# Patient Record
Sex: MALE | Race: WHITE | NOT HISPANIC OR LATINO | Employment: PART TIME | URBAN - METROPOLITAN AREA
[De-identification: names, ages, dates, MRNs, and addresses within clinical notes are randomized per-mention and may not be internally consistent; named-entity substitution may affect disease eponyms.]

---

## 2018-06-12 LAB
HEPATITIS C ANTIBODY (HISTORICAL): NORMAL
HIV 1/2 AB DIFFERENTIATION (HISTORICAL): NEGATIVE
RPR SCREEN (HISTORICAL): NORMAL

## 2018-06-14 LAB
HERPES SIMPLEX VIRUS 1 IGG (HISTORICAL): 28.3
HERPES SIMPLEX VIRUS 2 IGG (HISTORICAL): 0.07

## 2018-07-09 PROBLEM — Z72.0 TOBACCO USER: Status: ACTIVE | Noted: 2017-05-08

## 2018-07-09 PROBLEM — K21.9 GASTROESOPHAGEAL REFLUX DISEASE WITHOUT ESOPHAGITIS: Status: ACTIVE | Noted: 2017-05-08

## 2018-07-09 PROBLEM — F25.0 SCHIZOAFFECTIVE DISORDER, BIPOLAR TYPE (HCC): Status: ACTIVE | Noted: 2017-05-08

## 2018-07-09 PROBLEM — I10 BENIGN ESSENTIAL HYPERTENSION: Status: ACTIVE | Noted: 2017-05-08

## 2018-07-19 ENCOUNTER — HOSPITAL ENCOUNTER (INPATIENT)
Facility: HOSPITAL | Age: 48
LOS: 27 days | Discharge: HOME | DRG: 885 | End: 2018-08-16
Attending: EMERGENCY MEDICINE | Admitting: PSYCHIATRY & NEUROLOGY
Payer: COMMERCIAL

## 2018-07-19 DIAGNOSIS — R45.850 HOMICIDAL IDEATION: ICD-10-CM

## 2018-07-19 DIAGNOSIS — F25.0 SCHIZOAFFECTIVE DISORDER, BIPOLAR TYPE (HCC): Primary | ICD-10-CM

## 2018-07-19 DIAGNOSIS — R45.1 AGITATION: ICD-10-CM

## 2018-07-19 LAB
ALBUMIN SERPL BCP-MCNC: 4.6 G/DL (ref 3–5.2)
ALP SERPL-CCNC: 72 U/L (ref 43–122)
ALT SERPL W P-5'-P-CCNC: 49 U/L (ref 9–52)
AMPHETAMINES SERPL QL SCN: NEGATIVE
ANION GAP SERPL CALCULATED.3IONS-SCNC: 10 MMOL/L (ref 5–14)
AST SERPL W P-5'-P-CCNC: 41 U/L (ref 17–59)
BACTERIA UR QL AUTO: ABNORMAL /HPF
BARBITURATES UR QL: NEGATIVE
BASOPHILS # BLD AUTO: 0.1 THOUSANDS/ΜL (ref 0–0.1)
BASOPHILS NFR BLD AUTO: 1 % (ref 0–1)
BENZODIAZ UR QL: NEGATIVE
BILIRUB SERPL-MCNC: 0.6 MG/DL
BILIRUB UR QL STRIP: NEGATIVE
BUN SERPL-MCNC: 19 MG/DL (ref 5–25)
CALCIUM SERPL-MCNC: 9.6 MG/DL (ref 8.4–10.2)
CHLORIDE SERPL-SCNC: 106 MMOL/L (ref 97–108)
CLARITY UR: CLEAR
CO2 SERPL-SCNC: 28 MMOL/L (ref 22–30)
COCAINE UR QL: NEGATIVE
COLOR UR: YELLOW
CREAT SERPL-MCNC: 1.02 MG/DL (ref 0.7–1.5)
EOSINOPHIL # BLD AUTO: 0.1 THOUSAND/ΜL (ref 0–0.4)
EOSINOPHIL NFR BLD AUTO: 2 % (ref 0–6)
ERYTHROCYTE [DISTWIDTH] IN BLOOD BY AUTOMATED COUNT: 14.1 %
ETHANOL SERPL-MCNC: <10 MG/DL (ref 0–10)
GFR SERPL CREATININE-BSD FRML MDRD: 86 ML/MIN/1.73SQ M
GLUCOSE SERPL-MCNC: 117 MG/DL (ref 70–99)
GLUCOSE UR STRIP-MCNC: NEGATIVE MG/DL
HCT VFR BLD AUTO: 39.8 % (ref 41–53)
HGB BLD-MCNC: 13.4 G/DL (ref 13.5–17.5)
HGB UR QL STRIP.AUTO: 10
KETONES UR STRIP-MCNC: NEGATIVE MG/DL
LEUKOCYTE ESTERASE UR QL STRIP: 25
LYMPHOCYTES # BLD AUTO: 2.3 THOUSANDS/ΜL (ref 0.5–4)
LYMPHOCYTES NFR BLD AUTO: 32 % (ref 20–50)
MCH RBC QN AUTO: 30.9 PG (ref 26–34)
MCHC RBC AUTO-ENTMCNC: 33.8 G/DL (ref 31–36)
MCV RBC AUTO: 92 FL (ref 80–100)
METHADONE UR QL: NEGATIVE
MONOCYTES # BLD AUTO: 0.6 THOUSAND/ΜL (ref 0.2–0.9)
MONOCYTES NFR BLD AUTO: 8 % (ref 1–10)
NEUTROPHILS # BLD AUTO: 4.2 THOUSANDS/ΜL (ref 1.8–7.8)
NEUTS SEG NFR BLD AUTO: 58 % (ref 45–65)
NITRITE UR QL STRIP: NEGATIVE
NON-SQ EPI CELLS URNS QL MICRO: ABNORMAL /HPF
OPIATES UR QL SCN: NEGATIVE
PCP UR QL: NEGATIVE
PH UR STRIP.AUTO: 5 [PH] (ref 4.5–8)
PLATELET # BLD AUTO: 291 THOUSANDS/UL (ref 150–450)
PMV BLD AUTO: 9 FL (ref 8.9–12.7)
POTASSIUM SERPL-SCNC: 3.5 MMOL/L (ref 3.6–5)
PROT SERPL-MCNC: 7.8 G/DL (ref 5.9–8.4)
PROT UR STRIP-MCNC: ABNORMAL MG/DL
RBC # BLD AUTO: 4.35 MILLION/UL (ref 4.5–5.9)
RBC #/AREA URNS AUTO: ABNORMAL /HPF
SODIUM SERPL-SCNC: 144 MMOL/L (ref 137–147)
SP GR UR STRIP.AUTO: 1.02 (ref 1–1.04)
THC UR QL: NEGATIVE
UROBILINOGEN UA: NEGATIVE MG/DL
WBC # BLD AUTO: 7.2 THOUSAND/UL (ref 4.5–11)
WBC #/AREA URNS AUTO: ABNORMAL /HPF

## 2018-07-19 PROCEDURE — 80320 DRUG SCREEN QUANTALCOHOLS: CPT | Performed by: EMERGENCY MEDICINE

## 2018-07-19 PROCEDURE — 80053 COMPREHEN METABOLIC PANEL: CPT | Performed by: EMERGENCY MEDICINE

## 2018-07-19 PROCEDURE — 96372 THER/PROPH/DIAG INJ SC/IM: CPT

## 2018-07-19 PROCEDURE — 96374 THER/PROPH/DIAG INJ IV PUSH: CPT

## 2018-07-19 PROCEDURE — 85025 COMPLETE CBC W/AUTO DIFF WBC: CPT | Performed by: EMERGENCY MEDICINE

## 2018-07-19 PROCEDURE — 80307 DRUG TEST PRSMV CHEM ANLYZR: CPT | Performed by: EMERGENCY MEDICINE

## 2018-07-19 PROCEDURE — 36415 COLL VENOUS BLD VENIPUNCTURE: CPT | Performed by: EMERGENCY MEDICINE

## 2018-07-19 PROCEDURE — 81001 URINALYSIS AUTO W/SCOPE: CPT | Performed by: EMERGENCY MEDICINE

## 2018-07-19 RX ORDER — LORAZEPAM 2 MG/ML
INJECTION INTRAMUSCULAR
Status: COMPLETED
Start: 2018-07-19 | End: 2018-07-19

## 2018-07-19 RX ORDER — LORAZEPAM 2 MG/ML
2 INJECTION INTRAMUSCULAR ONCE
Status: COMPLETED | OUTPATIENT
Start: 2018-07-19 | End: 2018-07-19

## 2018-07-19 RX ORDER — HALOPERIDOL 5 MG/ML
10 INJECTION INTRAMUSCULAR ONCE
Status: COMPLETED | OUTPATIENT
Start: 2018-07-19 | End: 2018-07-19

## 2018-07-19 RX ORDER — BENZTROPINE MESYLATE 1 MG/ML
2 INJECTION INTRAMUSCULAR; INTRAVENOUS ONCE
Status: COMPLETED | OUTPATIENT
Start: 2018-07-19 | End: 2018-07-19

## 2018-07-19 RX ORDER — HALOPERIDOL 5 MG/ML
INJECTION INTRAMUSCULAR
Status: COMPLETED
Start: 2018-07-19 | End: 2018-07-19

## 2018-07-19 RX ORDER — BENZTROPINE MESYLATE 1 MG/ML
INJECTION INTRAMUSCULAR; INTRAVENOUS
Status: COMPLETED
Start: 2018-07-19 | End: 2018-07-19

## 2018-07-19 RX ADMIN — LORAZEPAM 2 MG: 2 INJECTION INTRAMUSCULAR; INTRAVENOUS at 17:57

## 2018-07-19 RX ADMIN — HALOPERIDOL LACTATE 10 MG: 5 INJECTION, SOLUTION INTRAMUSCULAR at 17:57

## 2018-07-19 RX ADMIN — BENZTROPINE MESYLATE 2 MG: 1 INJECTION INTRAMUSCULAR; INTRAVENOUS at 17:59

## 2018-07-19 RX ADMIN — HALOPERIDOL 10 MG: 5 INJECTION INTRAMUSCULAR at 17:57

## 2018-07-19 RX ADMIN — LORAZEPAM 2 MG: 2 INJECTION INTRAMUSCULAR at 17:57
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
## 2018-07-19 NOTE — ED NOTES
Patient change into hospital gown and made comfortable   Patient blood and urine is obtain and place on hold pending BLUE Whitney  07/19/18 9664

## 2018-07-20 PROCEDURE — 96372 THER/PROPH/DIAG INJ SC/IM: CPT

## 2018-07-20 PROCEDURE — 99285 EMERGENCY DEPT VISIT HI MDM: CPT

## 2018-07-20 RX ORDER — DIPHENHYDRAMINE HYDROCHLORIDE 50 MG/ML
50 INJECTION INTRAMUSCULAR; INTRAVENOUS EVERY 4 HOURS PRN
Status: DISCONTINUED | OUTPATIENT
Start: 2018-07-20 | End: 2018-08-16 | Stop reason: HOSPADM

## 2018-07-20 RX ORDER — PANTOPRAZOLE SODIUM 40 MG/1
40 TABLET, DELAYED RELEASE ORAL
Status: DISCONTINUED | OUTPATIENT
Start: 2018-07-21 | End: 2018-07-24

## 2018-07-20 RX ORDER — DIVALPROEX SODIUM 500 MG/1
500 TABLET, EXTENDED RELEASE ORAL DAILY
Status: DISCONTINUED | OUTPATIENT
Start: 2018-07-20 | End: 2018-07-24

## 2018-07-20 RX ORDER — HALOPERIDOL 5 MG
5 TABLET ORAL EVERY 4 HOURS PRN
Status: DISCONTINUED | OUTPATIENT
Start: 2018-07-20 | End: 2018-07-24

## 2018-07-20 RX ORDER — OLANZAPINE 10 MG/1
10 TABLET ORAL 3 TIMES DAILY
Status: DISCONTINUED | OUTPATIENT
Start: 2018-07-20 | End: 2018-07-25

## 2018-07-20 RX ORDER — OLANZAPINE 10 MG/1
INJECTION, POWDER, LYOPHILIZED, FOR SOLUTION INTRAMUSCULAR
Status: COMPLETED
Start: 2018-07-20 | End: 2018-07-20

## 2018-07-20 RX ORDER — HALOPERIDOL 5 MG/ML
5 INJECTION INTRAMUSCULAR EVERY 4 HOURS PRN
Status: DISCONTINUED | OUTPATIENT
Start: 2018-07-20 | End: 2018-07-24

## 2018-07-20 RX ORDER — LANOLIN ALCOHOL/MO/W.PET/CERES
9 CREAM (GRAM) TOPICAL
Status: DISCONTINUED | OUTPATIENT
Start: 2018-07-20 | End: 2018-08-16 | Stop reason: HOSPADM

## 2018-07-20 RX ORDER — TRAZODONE HYDROCHLORIDE 50 MG/1
50 TABLET ORAL
Status: DISCONTINUED | OUTPATIENT
Start: 2018-07-20 | End: 2018-07-20

## 2018-07-20 RX ORDER — HYDROXYZINE 50 MG/1
100 TABLET, FILM COATED ORAL EVERY 6 HOURS PRN
Status: DISCONTINUED | OUTPATIENT
Start: 2018-07-20 | End: 2018-08-16 | Stop reason: HOSPADM

## 2018-07-20 RX ORDER — ACETAMINOPHEN 325 MG/1
650 TABLET ORAL EVERY 6 HOURS PRN
Status: DISCONTINUED | OUTPATIENT
Start: 2018-07-20 | End: 2018-07-20

## 2018-07-20 RX ORDER — LORAZEPAM 2 MG/ML
2 INJECTION INTRAMUSCULAR EVERY 6 HOURS PRN
Status: DISCONTINUED | OUTPATIENT
Start: 2018-07-20 | End: 2018-08-16 | Stop reason: HOSPADM

## 2018-07-20 RX ORDER — IBUPROFEN 400 MG/1
400 TABLET ORAL EVERY 6 HOURS PRN
Status: DISCONTINUED | OUTPATIENT
Start: 2018-07-20 | End: 2018-08-16 | Stop reason: HOSPADM

## 2018-07-20 RX ORDER — LORAZEPAM 1 MG/1
1 TABLET ORAL EVERY 4 HOURS PRN
Status: DISCONTINUED | OUTPATIENT
Start: 2018-07-20 | End: 2018-08-16 | Stop reason: HOSPADM

## 2018-07-20 RX ORDER — OLANZAPINE 10 MG/1
10 INJECTION, POWDER, LYOPHILIZED, FOR SOLUTION INTRAMUSCULAR ONCE
Status: COMPLETED | OUTPATIENT
Start: 2018-07-20 | End: 2018-07-20

## 2018-07-20 RX ORDER — AMLODIPINE BESYLATE 5 MG/1
10 TABLET ORAL EVERY 24 HOURS
Status: DISCONTINUED | OUTPATIENT
Start: 2018-07-20 | End: 2018-08-08

## 2018-07-20 RX ORDER — BENZTROPINE MESYLATE 1 MG/1
1 TABLET ORAL EVERY 4 HOURS PRN
Status: DISCONTINUED | OUTPATIENT
Start: 2018-07-20 | End: 2018-08-16 | Stop reason: HOSPADM

## 2018-07-20 RX ORDER — ACETAMINOPHEN 325 MG/1
650 TABLET ORAL EVERY 4 HOURS PRN
Status: DISCONTINUED | OUTPATIENT
Start: 2018-07-20 | End: 2018-08-16 | Stop reason: HOSPADM

## 2018-07-20 RX ADMIN — DIVALPROEX SODIUM 500 MG: 500 TABLET, EXTENDED RELEASE ORAL at 16:10

## 2018-07-20 RX ADMIN — HALOPERIDOL 5 MG: 5 TABLET ORAL at 18:34

## 2018-07-20 RX ADMIN — OLANZAPINE 10 MG: 10 INJECTION, POWDER, LYOPHILIZED, FOR SOLUTION INTRAMUSCULAR at 12:25

## 2018-07-20 RX ADMIN — LORAZEPAM 1 MG: 1 TABLET ORAL at 21:11

## 2018-07-20 RX ADMIN — BENZTROPINE MESYLATE 1 MG: 0.5 TABLET ORAL at 21:11

## 2018-07-20 RX ADMIN — OLANZAPINE 10 MG: 10 TABLET, FILM COATED ORAL at 21:10

## 2018-07-20 RX ADMIN — OLANZAPINE 10 MG: 10 INJECTION, POWDER, FOR SOLUTION INTRAMUSCULAR at 12:25

## 2018-07-20 RX ADMIN — Medication 1 TABLET: at 16:09

## 2018-07-20 RX ADMIN — AMLODIPINE BESYLATE 10 MG: 5 TABLET ORAL at 16:09

## 2018-07-20 NOTE — ED NOTES
Patient in room, asking to be able to smoke one last cigarette before he goes up  Staff explained to him that this is a no smoking hospital and no one is allowed to smoke  Patient went back to sleep       Marbella Squires  07/20/18 1554

## 2018-07-20 NOTE — ED NOTES
Pt given toast and coffee with no other needs at this time     [address]  07/20/18 6042

## 2018-07-20 NOTE — ED NOTES
Insurance Authorization:   [id number] [facility] [address] Request faxed to  [id number]  Level of care: IP Psychiatry  Awaiting authorization number

## 2018-07-20 NOTE — ED NOTES
Per Shital Shaver at [facility], [phone], the patient previously had a guardian  However, once psychiatrically stabilized, the patient appealed and the guardianship order was overturned

## 2018-07-20 NOTE — ED NOTES
Patient came out and asked if he can pace back and forth in the room      Livia Templeton  07/20/18 2598

## 2018-07-20 NOTE — ED NOTES
Patient is accepted at [facility]  Patient is accepted by Dr Katiuska eBdolla            Patient may go to the floor following discharges from [facility]  Nurse report is to be called to  prior to patient transfer

## 2018-07-20 NOTE — ED NOTES
Pt safe in room at this time, asleep  Chest rising and pt in no distress or discomfort at this time    Continues to be a 1:1        Jeanne Murray RN  07/20/18 0873

## 2018-07-20 NOTE — ED NOTES
As per Dr Talya Castro, patient does not require 1:1 observation at this time  Patient remains within view of his nurse's workstation and will continue to be monitored       Quinn Thompson RN  07/20/18 0710

## 2018-07-20 NOTE — ED NOTES
Patient in room asked for his ID band to be changed because his name is Rhoda Pham and not [employer]  Then asked for a chair to sit because he did not want to sit on his bed after is made  Patient moved his furniture around to make it feel more like home        Wilfrid Monaco  07/20/18 9548

## 2018-07-20 NOTE — ED NOTES
Insurance Authorization: [employer]   Phone call placed to [facility]  Phone number: [phone]   Spoke to [place]  Level of care: IP Psychiatry  Call upon discharge  Authorization # not issued for [employer]

## 2018-07-20 NOTE — ED NOTES
Safety risk was reassessed  The patient is now a high risk for self-harm  Pt attempted to inconspicuously wrap a bed sheet around his neck while covered with a blanket
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
## 2018-07-20 NOTE — ED NOTES
Pt mostly laying in bed, occasionally pacing in room    1:1 obs remains for behavior/elopment risk       Bozena Claudio RN  07/20/18 8494
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
## 2018-07-20 NOTE — PLAN OF CARE
DISCHARGE PLANNING     Discharge to home or other facility with appropriate resources Not Progressing

## 2018-07-20 NOTE — ED NOTES
Patient moving furniture around in his room, pulled the emergency light in the bathroom again        Tian Chris  07/20/18 1003

## 2018-07-20 NOTE — ED NOTES
Patient went to the bathroom and pulled the emergency light on, because he wants a cigarette   Staff explained to him that there is no smoking in this hospital      Mariza Blake  07/20/18 7706

## 2018-07-20 NOTE — ED NOTES
Pt found to be pacing around and outside of room, attempted to leave via EMS doors, stated to be looking for a bathroom  Pt pacing/rambling  Placed pt back on 1:1 obs status d/t behavior/elopement risk  Aurora Amor RN  07/20/18 3653    7/20/2018, 0020, sign-out received, [id number], pt signed out to Dr Nuria Styles MD  07/20/18 3883

## 2018-07-20 NOTE — ED NOTES
Pt had been sleeping for last few hours, woke up and immediately ran out of room into hallway asking for bathroom, attempting to leave via EMS doors again, 1:1 staff able to redirect pt back into room, using bathroom in pts room  Returned to bed         Sabra Brower RN  07/20/18 6415

## 2018-07-20 NOTE — ED NOTES
Patient talking about wanting to adopt a child and how he is going to talk to the  of his [employer] to sign for him and also talk to the president of the [address] [place] [address] [address]       Laci Shade  07/20/18 9642

## 2018-07-21 RX ORDER — OLANZAPINE 10 MG/1
10 INJECTION, POWDER, LYOPHILIZED, FOR SOLUTION INTRAMUSCULAR EVERY 6 HOURS PRN
Status: DISCONTINUED | OUTPATIENT
Start: 2018-07-21 | End: 2018-07-24

## 2018-07-21 RX ORDER — OLANZAPINE 5 MG/1
5 TABLET ORAL EVERY 4 HOURS PRN
Status: DISCONTINUED | OUTPATIENT
Start: 2018-07-21 | End: 2018-07-24

## 2018-07-21 RX ORDER — OLANZAPINE 5 MG/1
5 TABLET ORAL 2 TIMES DAILY
Status: DISCONTINUED | OUTPATIENT
Start: 2018-07-21 | End: 2018-07-21

## 2018-07-21 RX ADMIN — Medication 1 TABLET: at 08:18

## 2018-07-21 RX ADMIN — HALOPERIDOL 5 MG: 5 TABLET ORAL at 11:53

## 2018-07-21 RX ADMIN — BENZTROPINE MESYLATE 1 MG: 0.5 TABLET ORAL at 19:29

## 2018-07-21 RX ADMIN — BENZTROPINE MESYLATE 1 MG: 0.5 TABLET ORAL at 11:53

## 2018-07-21 RX ADMIN — LORAZEPAM 1 MG: 1 TABLET ORAL at 19:29

## 2018-07-21 RX ADMIN — OLANZAPINE 5 MG: 10 TABLET, FILM COATED ORAL at 11:40

## 2018-07-21 RX ADMIN — HALOPERIDOL 5 MG: 5 TABLET ORAL at 19:30

## 2018-07-21 NOTE — NURSING NOTE
Patient slept well throughout the night and did not require any PRN medications during the night  Patient was resting comfortably all shift and was cooperative with staff before going to sleep  No issues noted  Patient did wake up upset looking for his wallet and threw hias water down the hallway when asked to wait until report was over  Patient did regain control shortly thereafter

## 2018-07-21 NOTE — PROGRESS NOTES
Patient disorganized, wanders the unit and rambles when trying to have a conversation  Patient was quiet at the beginning of the shift  He was calm and was going to take the Zyprexa but refused blood pressure medication  His blood pressure was elevated as charted in Epic and when I asked him a second time for blood pressure meds, he started yelling about someone stealing his belongings  Patient is in his room folding clothes at this moment, behaving calmly, with yelling episode over and now refused his Zyprexa for a third time

## 2018-07-21 NOTE — PLAN OF CARE
Problem: PSYCHOSIS  Goal: Will report no hallucinations or delusions  Interventions: Carole JOLLEY ATR-BC  - Encourage to attend and participate in art therapy once he is off 1:1   - Provide container for psychosis/delusions  - Provide means to improve organization     Outcome: Not Progressing      Problem: ANXIETY  Goal: Will report anxiety at manageable levels  INTERVENTIONS: Carole JOLLEY ATR-BC  - Encourage to attend and participate in art therapy once he is off 1:1   - Provide means to diffuse, manage and address anxiety  - Explore concepts of ownership, awareness and healthy expression     Outcome: Not Progressing      Problem: SELF CARE DEFICIT  Goal: Return ADL status to a safe level of function  INTERVENTIONS: Carole JOLLEY ATR-BC  - Encourage to attend and participate in art therapy once he is off 1:1   - Provide means incorporate new coping strategies and self care  - Explore concepts of alternative perspective, awareness  and healthy expression    Outcome: Not Progressing

## 2018-07-21 NOTE — H&P
Initial Psychiatric Evaluation    Medical Record Number: [id number]  Encounter: [id number]      History:     Syed Chatterjee is an 50 y o , male, admitted to the psychiatric unit under a 302 status to Dr Zarina Maddox' service with the chief complaint of increased agitation and psychosis  Patient recently stopped his psychotropic medications  Since stopping his medications he has progressively been steadily gotten more psychotic and delusional   The patient originally arrived to the emergency department by the way of the [facility] police escort as well as a   There is an active investigation against the patient for arson  The patient was reportedly seen walking away from his neighbors burning home with a gas can  Patient has been increasingly paranoid about the neighbor and believes that they have been spying on him and watching him  In addition to this the patient had reportedly become annoyed with the children at the  located near his apartment  He has also been reportedly threatening the children there  The patient reported to the police that he wanted to hurt people in the neighborhood  He was searched by the police and found to have an open knife wrapped in cloth and stuffed in his pants  He states that he is having difficulty with the government and that he has to get in touch with the president of [facility] to discuss this as their friends  Patient also reports that he is involved in the QR for HIV/aids and this involves harvesting the liver is of parrot-found in the [place] off the [facility]  After the patient was cleared from medical standpoint he was transferred to the adult psychiatric unit for further evaluation and treatment  Initially on the unit the patient appears quite psychomotor agitated and restless  He is very guarded and suspicious of people as well as medications    Initially the patient was refusing to talk I however after 5 or 6 times was more willing to speak with me  Ultimately he did take his medications this morning  After the patient became more comfortable with me he opened up as to why he was here  The patient reports that he thinks part of the reason that he is admitted here is because he recently changed political parties  He states he was previously independent party and now she changed to the [employer]  He is interested in starting a new party called the [employer]  He wants to have meetings at a building that he is helping to purchase an [facility] [place] on [address] [facility] [address] [facility]  The patient is floridly psychotic and delusional   He is difficult to talk to did his level of internal preoccupation  Past Medical History:   Diagnosis Date    Anxiety     Chronic kidney disease     Depression     Psychiatric illness     Psychosis     PTSD (post-traumatic stress disorder)     white vans make him anxious    [facility]     Schizophrenia ([facility] )        Past surgical history:  History reviewed  No pertinent surgical history  Family history:  History reviewed  No pertinent family history      Current medications:    Current Facility-Administered Medications:     acetaminophen (TYLENOL) tablet 650 mg, 650 mg, Oral, Q4H PRN, Damian Smyht MD    amLODIPine (NORVASC) tablet 10 mg, 10 mg, Oral, Q24H, Damian Smyth MD, 10 mg at 07/20/18 1609    benztropine (COGENTIN) tablet 1 mg, 1 mg, Oral, Q4H PRN, Nadir Madrid PA-C, 1 mg at 07/21/18 1153    diphenhydrAMINE (BENADRYL) injection 50 mg, 50 mg, Intramuscular, Q4H PRN, Nadir Madrid PA-C    divalproex sodium (DEPAKOTE ER) 24 hr tablet 500 mg, 500 mg, Oral, Daily, Nadir Julius, PA-C, 500 mg at 07/20/18 1610    haloperidol (HALDOL) tablet 5 mg, 5 mg, Oral, Q4H PRN, Nadir Hoganles, PA-C, 5 mg at 07/21/18 1153    haloperidol lactate (HALDOL) injection 5 mg, 5 mg, Intramuscular, Q4H PRN, Nadir Madrid PA-C    hydrOXYzine HCL (ATARAX) tablet 100 mg, 100 mg, Oral, Q6H PRN, Dionicio Ingles, PA-C    ibuprofen (MOTRIN) tablet 400 mg, 400 mg, Oral, Q6H PRN, Dionicio Ingles, PA-C    LORazepam (ATIVAN) 2 mg/mL injection 2 mg, 2 mg, Intramuscular, Q6H PRN, Dionicio Ingles, PA-C    LORazepam (ATIVAN) tablet 1 mg, 1 mg, Oral, Q4H PRN, Dionicio Ingles, PA-C, 1 mg at 07/20/18 2111    melatonin tablet 9 mg, 9 mg, Oral, HS PRN, Dionicio Ingles, PA-C    multivitamin-minerals (CENTRUM) tablet 1 tablet, 1 tablet, Oral, Daily, Michael Padilla MD, 1 tablet at 07/21/18 0818    OLANZapine (ZyPREXA) IM injection 10 mg, 10 mg, Intramuscular, Q6H PRN, Latrelle , PA-C    OLANZapine (ZyPREXA) tablet 10 mg, 10 mg, Oral, TID, Dionicio Ingles, PA-C, 10 mg at 07/20/18 2110    OLANZapine (ZyPREXA) tablet 5 mg, 5 mg, Oral, BID, Latrelle , PA-C, 5 mg at 07/21/18 1140    OLANZapine (ZyPREXA) tablet 5 mg, 5 mg, Oral, Q4H PRN, Latrelle , PA-C    pantoprazole (PROTONIX) EC tablet 40 mg, 40 mg, Oral, Daily Before Breakfast, Michael Padilla MD      Allergies: Allergies   Allergen Reactions    No Active Allergies        Social History:  Social History     Social History    Marital status: Single     Spouse name: N/A    Number of children: N/A    Years of education: N/A     Occupational History    Not on file       Social History Main Topics    Smoking status: Current Every Day Smoker     Packs/day: 0 50     Types: Cigarettes    Smokeless tobacco: Never Used    Alcohol use Yes      Comment: socially use    Drug use: No    Sexual activity: Not Currently     Other Topics Concern    Not on file     Social History Narrative    No narrative on file         Physical Examination:     Vital Signs:  Vitals:    07/20/18 1318 07/20/18 1431 07/21/18 0751 07/21/18 0752   BP: 132/93 141/97 103/57 109/83   BP Location: Left arm Left arm Left arm Left arm   Pulse: 66 67 90 92   Resp: 20 16 16    Temp:  97 6 °F (36 4 °C) (!) 96 2 °F (35 7 °C)    TempSrc:  Temporal Temporal SpO2: 100% 100%     Weight:  69 4 kg (153 lb)     Height:  6' (1 829 m)             Appearance:  age appropriate   Behavior:  guarded, psychomotor agitation and restless and fidgety   Speech:  pressured and tangential   Mood:  irritable and labile   Affect:  labile   Thought Process:  circumstantial and flight of ideas   Thought Content:  delusions  grandiose and persecutory   Perceptual Disturbances: Auditory hallucinations without commands and Visual hallucinations   Risk Potential: Homicidal Ideations without plan   Sensorium:  person, place and time/date   Cognition:  grossly intact   Consciousness:  alert    Attention: attention span and concentration were age appropriate   Intellect: within normal limits   Insight:  poor      Judgment: poor      Motor Activity: no abnormal movements                 Diagnostic Studies:     Recent Labs:  Results Reviewed     Procedure Component Value Units Date/Time    Urine Microscopic [[id number]]  (Abnormal) Collected:  07/19/18 1759    Lab Status:  Final result Specimen:  Urine from Urine, Clean Catch Updated:  07/19/18 1857     RBC, UA 2-4 (A) /hpf      WBC, UA 4-10 (A) /hpf      Epithelial Cells Occasional /hpf      Bacteria, UA None Seen /hpf     Rapid drug screen, urine [[id number]]  (Normal) Collected:  07/19/18 1759    Lab Status:  Final result Specimen:  Urine from Urine, Clean Catch Updated:  07/19/18 1839     Amph/Meth UR Negative     Barbiturate Ur Negative     Benzodiazepine Urine Negative     Cocaine Urine Negative     Methadone Urine Negative     Opiate Urine Negative     PCP Ur Negative     THC Urine Negative    Narrative:         FOR MEDICAL PURPOSES ONLY  IF CONFIRMATION NEEDED PLEASE CONTACT THE LAB WITHIN 5 DAYS      Drug Screen Cutoff Levels:  AMPHETAMINE/METHAMPHETAMINES  1000 ng/mL  BARBITURATES     200 ng/mL  BENZODIAZEPINES     200 ng/mL  COCAINE      300 ng/mL  METHADONE      300 ng/mL  OPIATES      300 ng/mL  PHENCYCLIDINE     25 ng/mL  THC       50 ng/mL    Ethanol [[id number]]  (Normal) Collected:  07/19/18 1757    Lab Status:  Final result Specimen:  Blood from Arm, Left Updated:  07/19/18 1826     Ethanol Lvl <10 mg/dL     Comprehensive metabolic panel [[id number]]  (Abnormal) Collected:  07/19/18 1757    Lab Status:  Final result Specimen:  Blood from Arm, Left Updated:  07/19/18 1826     Sodium 144 mmol/L      Potassium 3 5 (L) mmol/L      Chloride 106 mmol/L      CO2 28 mmol/L      Anion Gap 10 mmol/L      BUN 19 mg/dL      Creatinine 1 02 mg/dL      Glucose 117 (H) mg/dL      Calcium 9 6 mg/dL      AST 41 U/L      ALT 49 U/L      Alkaline Phosphatase 72 U/L      Total Protein 7 8 g/dL      Albumin 4 6 g/dL      Total Bilirubin 0 60 mg/dL      eGFR 86 ml/min/1 73sq m     Narrative:         National Kidney Disease Education Program recommendations are as follows:  GFR calculation is accurate only with a steady state creatinine  Chronic Kidney disease less than 60 ml/min/1 73 sq  meters  Kidney failure less than 15 ml/min/1 73 sq  meters      UA w Reflex to Microscopic [[id number]]  (Abnormal) Collected:  07/19/18 1759    Lab Status:  Final result Specimen:  Urine from Urine, Clean Catch Updated:  07/19/18 1823     Color, UA Yellow     Clarity, UA Clear     Specific Gravity, UA 1 025     pH, UA 5 0     Leukocytes, UA 25 0 (A)     Nitrite, UA Negative     Protein, UA 30 (1+) (A) mg/dl      Glucose, UA Negative mg/dl      Ketones, UA Negative mg/dl      Bilirubin, UA Negative     Blood, UA 10 0 (A)     UROBILINOGEN UA Negative mg/dL     CBC and differential [[id number]]  (Abnormal) Collected:  07/19/18 1757    Lab Status:  Final result Specimen:  Blood from Arm, Left Updated:  07/19/18 1811     WBC 7 20 Thousand/uL      RBC 4 35 (L) Million/uL      Hemoglobin 13 4 (L) g/dL      Hematocrit 39 8 (L) %      MCV 92 fL      MCH 30 9 pg      MCHC 33 8 g/dL      RDW 14 1 %      MPV 9 0 fL      Platelets 503 Thousands/uL      Neutrophils Relative 58 %      Lymphocytes Relative 32 %      Monocytes Relative 8 %      Eosinophils Relative 2 %      Basophils Relative 1 %      Neutrophils Absolute 4 20 Thousands/µL      Lymphocytes Absolute 2 30 Thousands/µL      Monocytes Absolute 0 60 Thousand/µL      Eosinophils Absolute 0 10 Thousand/µL      Basophils Absolute 0 10 Thousands/µL           I/O Past 24 hours:  No intake/output data recorded  No intake/output data recorded  Impression / Plan:     Schizoaffective disorder, bipolar type ([facility] [address] )    Recommended Treatment:      Medications  1) at this time we will continue Zyprexa and Haldol as well as Ativan to help with his psychotic behaviors  Non-pharmacological treatments  1) Continue with group therapy, milieu therapy and occupational therapy  2) Medical will be consulted to help manage comorbid conditions    Safety  1) Safety/communication plan established targeting dynamic risk factors above  Counseling / Coordination of Care    Total floor / unit time spent today 50 minutes  Greater than 50% of total time was spent with the patient and / or family counseling and / or coordination of care  A description of the counseling / coordination of care  Patient's Rights, confidentiality and exceptions to confidentiality, use of automated medical record, [facility] access to medical record, and consent to treatment reviewed        Sunita Figueroa PA-C

## 2018-07-21 NOTE — PROGRESS NOTES
Pt is very disorganized  During morning report pt walked the halls throwing water on the floor an stepping on cups and lids  Pt refused all his morning medications except his multivitamin  Pt continues to be disruptive and cursing at staff  Pt was given Haldol, Zyprexa, and Cogentin  Pt became much more calm not much longer after receiving these medications  Pt ate lunch with difficulty  Will continue to monitor

## 2018-07-21 NOTE — PROGRESS NOTES
Patient came to [facility] and asked for his Haldol, Cogentin and Zyprexa  He said these as needed medications helped him earlier today  Patient refused the scheduled Zyprexa at 1600/4pm and I explained that he could not get the Zyprexa till at least 2100/9pm  Patient then stated, "Well if I cannot get Zyprexa can I have Ativan, Cogentin and Haldol?" I administered these as needed as requested because earlier patient became agitated with his medications and that is why he refused them  I gave report to Darryl Machado RN at this time

## 2018-07-22 RX ADMIN — BENZTROPINE MESYLATE 1 MG: 0.5 TABLET ORAL at 06:30

## 2018-07-22 RX ADMIN — LORAZEPAM 1 MG: 1 TABLET ORAL at 06:30

## 2018-07-22 RX ADMIN — AMLODIPINE BESYLATE 10 MG: 5 TABLET ORAL at 20:33

## 2018-07-22 NOTE — NURSING NOTE
Maikol Petty, RN Registered Nurse Incomplete   Nursing Note Date of Service: 7/22/2018  6:35 AM         []Hide copied text  []Hover for attribution information  Patient visible at this time requesting Ativan 1 mg as well as Cogentin 1 mg after waking up  Patient slept well the entire night and did not wake up all night   Slept well with no complaints

## 2018-07-22 NOTE — PROGRESS NOTES
Psychiatry Progress Note    Subjective: Interval History     Patient is much more calm and cooperative today  He has been compliant with medications since yesterday morning  His thoughts are more organized and appears less overtly psychotic  He is asking more appropriate questions  Despite this he continues to wander the unit and rambles with speech  Still delusional and grandiose  Will yell out occasionally but less frequently than time of admission        Current medications:    Current Facility-Administered Medications:     acetaminophen (TYLENOL) tablet 650 mg, 650 mg, Oral, Q4H PRN, Lindsay Bellamy MD    amLODIPine (NORVASC) tablet 10 mg, 10 mg, Oral, Q24H, Lindsay Bellamy MD, 10 mg at 07/20/18 1609    benztropine (COGENTIN) tablet 1 mg, 1 mg, Oral, Q4H PRN, Alyson Jarrett PA-C, 1 mg at 07/22/18 0630    diphenhydrAMINE (BENADRYL) injection 50 mg, 50 mg, Intramuscular, Q4H PRN, Alyson Jarrett PA-C    divalproex sodium (DEPAKOTE ER) 24 hr tablet 500 mg, 500 mg, Oral, Daily, Alyson Jarrett PA-C, 500 mg at 07/22/18 0848    haloperidol (HALDOL) tablet 5 mg, 5 mg, Oral, Q4H PRN, Alyson Jarrett PA-C, 5 mg at 07/21/18 1930    haloperidol lactate (HALDOL) injection 5 mg, 5 mg, Intramuscular, Q4H PRN, Alyson Jarrett PA-C    hydrOXYzine HCL (ATARAX) tablet 100 mg, 100 mg, Oral, Q6H PRN, Alyson Jarrett PA-C    ibuprofen (MOTRIN) tablet 400 mg, 400 mg, Oral, Q6H PRN, Alyson Jarrett PA-C    LORazepam (ATIVAN) 2 mg/mL injection 2 mg, 2 mg, Intramuscular, Q6H PRN, Alyson Jarrett PA-C    LORazepam (ATIVAN) tablet 1 mg, 1 mg, Oral, Q4H PRN, DAVID Rubalcava-ROSA, 1 mg at 07/22/18 0630    melatonin tablet 9 mg, 9 mg, Oral, HS PRN, Alyson Jarrett PA-C    multivitamin-minerals (CENTRUM) tablet 1 tablet, 1 tablet, Oral, Daily, Lindsay Bellamy MD, 1 tablet at 07/22/18 0848    OLANZapine (ZyPREXA) IM injection 10 mg, 10 mg, Intramuscular, Q6H PRN, Nova Gilliam PA-C    OLANZapine (ZyPREXA) tablet 10 mg, 10 mg, Oral, TID, Scott Bell, PA-C, 10 mg at 07/22/18 0848    OLANZapine (ZyPREXA) tablet 5 mg, 5 mg, Oral, Q4H PRN, Maritza Gonzalez PA-C    pantoprazole (PROTONIX) EC tablet 40 mg, 40 mg, Oral, Daily Before Breakfast, Jefry Barrios MD      Objective:     Vital Signs:  Vitals:    07/21/18 0752 07/21/18 1520 07/22/18 0636 07/22/18 0638   BP: 109/83 (!) 143/105 116/82 117/89   BP Location: Left arm  Right arm Right arm   Pulse: 92 89 65 77   Resp:   18 18   Temp:   (!) 97 4 °F (36 3 °C)    TempSrc:   Temporal    SpO2:   98%    Weight:    71 2 kg (157 lb)   Height:               Appearance:  age appropriate   Behavior:  guarded, psychomotor agitation and restless and fidgety   Speech:  pressured and tangential   Mood:  irritable and labile   Affect:  labile   Thought Process:  circumstantial and flight of ideas   Thought Content:  delusions  grandiose   Perceptual Disturbances: Visual hallucinations   Risk Potential: None   Sensorium:  person, place and time/date   Cognition:  grossly intact   Consciousness:  alert    Attention: attention span and concentration were age appropriate   Intellect: within normal limits   Insight:  poor      Judgment: poor      Motor Activity: no abnormal movements             Recent Labs:  Results Reviewed     Procedure Component Value Units Date/Time    Urine Microscopic [[id number]]  (Abnormal) Collected:  07/19/18 1759    Lab Status:  Final result Specimen:  Urine from Urine, Clean Catch Updated:  07/19/18 1857     RBC, UA 2-4 (A) /hpf      WBC, UA 4-10 (A) /hpf      Epithelial Cells Occasional /hpf      Bacteria, UA None Seen /hpf     Rapid drug screen, urine [[id number]]  (Normal) Collected:  07/19/18 1759    Lab Status:  Final result Specimen:  Urine from Urine, Clean Catch Updated:  07/19/18 1839     Amph/Meth UR Negative     Barbiturate Ur Negative     Benzodiazepine Urine Negative     Cocaine Urine Negative     Methadone Urine Negative     Opiate Urine Negative     PCP Ur Negative     THC Urine Negative    Narrative:         FOR MEDICAL PURPOSES ONLY  IF CONFIRMATION NEEDED PLEASE CONTACT THE LAB WITHIN 5 DAYS  Drug Screen Cutoff Levels:  AMPHETAMINE/METHAMPHETAMINES  1000 ng/mL  BARBITURATES     200 ng/mL  BENZODIAZEPINES     200 ng/mL  COCAINE      300 ng/mL  METHADONE      300 ng/mL  OPIATES      300 ng/mL  PHENCYCLIDINE     25 ng/mL  THC       50 ng/mL    Ethanol [[id number]]  (Normal) Collected:  07/19/18 1757    Lab Status:  Final result Specimen:  Blood from Arm, Left Updated:  07/19/18 1826     Ethanol Lvl <10 mg/dL     Comprehensive metabolic panel [[id number]]  (Abnormal) Collected:  07/19/18 1757    Lab Status:  Final result Specimen:  Blood from Arm, Left Updated:  07/19/18 1826     Sodium 144 mmol/L      Potassium 3 5 (L) mmol/L      Chloride 106 mmol/L      CO2 28 mmol/L      Anion Gap 10 mmol/L      BUN 19 mg/dL      Creatinine 1 02 mg/dL      Glucose 117 (H) mg/dL      Calcium 9 6 mg/dL      AST 41 U/L      ALT 49 U/L      Alkaline Phosphatase 72 U/L      Total Protein 7 8 g/dL      Albumin 4 6 g/dL      Total Bilirubin 0 60 mg/dL      eGFR 86 ml/min/1 73sq m     Narrative:         National Kidney Disease Education Program recommendations are as follows:  GFR calculation is accurate only with a steady state creatinine  Chronic Kidney disease less than 60 ml/min/1 73 sq  meters  Kidney failure less than 15 ml/min/1 73 sq  meters      UA w Reflex to Microscopic [[id number]]  (Abnormal) Collected:  07/19/18 1759    Lab Status:  Final result Specimen:  Urine from Urine, Clean Catch Updated:  07/19/18 1823     Color, UA Yellow     Clarity, UA Clear     Specific Gravity, UA 1 025     pH, UA 5 0     Leukocytes, UA 25 0 (A)     Nitrite, UA Negative     Protein, UA 30 (1+) (A) mg/dl      Glucose, UA Negative mg/dl      Ketones, UA Negative mg/dl      Bilirubin, UA Negative     Blood, UA 10 0 (A)     UROBILINOGEN UA Negative mg/dL     CBC and differential [[id number]]  (Abnormal) Collected:  07/19/18 1757    Lab Status:  Final result Specimen:  Blood from Arm, Left Updated:  07/19/18 1811     WBC 7 20 Thousand/uL      RBC 4 35 (L) Million/uL      Hemoglobin 13 4 (L) g/dL      Hematocrit 39 8 (L) %      MCV 92 fL      MCH 30 9 pg      MCHC 33 8 g/dL      RDW 14 1 %      MPV 9 0 fL      Platelets 465 Thousands/uL      Neutrophils Relative 58 %      Lymphocytes Relative 32 %      Monocytes Relative 8 %      Eosinophils Relative 2 %      Basophils Relative 1 %      Neutrophils Absolute 4 20 Thousands/µL      Lymphocytes Absolute 2 30 Thousands/µL      Monocytes Absolute 0 60 Thousand/µL      Eosinophils Absolute 0 10 Thousand/µL      Basophils Absolute 0 10 Thousands/µL           I/O Past 24 hours:  No intake/output data recorded  No intake/output data recorded  Assessment / Plan:     [facility]ffective disorder, [facility] ([facility]    Recommended Treatment:      Medication changes:  1) continue current medications    Non-pharmacological treatments  1) Continue with group therapy, milieu therapy and occupational therapy  Safety  1) Safety/communication plan established targeting dynamic risk factors above  2) Risks, benefits, and possible side effects of medications explained to patient and patient verbalizes understanding  Counseling / Coordination of Care    Total floor / unit time spent today 20 minutes  Greater than 50% of total time was spent with the patient and / or family counseling and / or coordination of care  A description of the counseling / coordination of care  Patient's Rights, confidentiality and exceptions to confidentiality, use of automated medical record, [facility] staff access to medical record, and consent to treatment reviewed      Jonnie Nam PA-C

## 2018-07-22 NOTE — PROGRESS NOTES
Pt's /100's @ 1700  Refused norvasc because he states his BP is WNL & doesn't need it  Explained to pt that his BP is high  Pt became beligerent & started yelling out meds that he takes  Told pt to lower his voice  He became angry & walked away without taking meds  Currently in obs room under 1:1 observation   Will continue to monitor

## 2018-07-22 NOTE — PROGRESS NOTES
Pt received @ 0700  Refused all morning meds  Stated "You take it & let me know how it feels in your body"  Isolative to self & obs room most of the the morning  Pleasant & cooperative in the afternnon  Ate 100 % of meals   Remains on 1:1 observation, will continue to monitor

## 2018-07-23 ENCOUNTER — DOCUMENTATION (OUTPATIENT)
Dept: BEHAVIORAL HEALTH UNIT | Facility: HOSPITAL | Age: 48
End: 2018-07-23

## 2018-07-23 RX ADMIN — BENZTROPINE MESYLATE 1 MG: 0.5 TABLET ORAL at 08:41

## 2018-07-23 RX ADMIN — OLANZAPINE 10 MG: 10 TABLET, FILM COATED ORAL at 22:05

## 2018-07-23 RX ADMIN — HALOPERIDOL 5 MG: 5 TABLET ORAL at 08:40

## 2018-07-23 RX ADMIN — LORAZEPAM 1 MG: 1 TABLET ORAL at 08:40

## 2018-07-23 NOTE — PROGRESS NOTES
Psychiatry Progress Note    Subjective: Interval History     Patient continues to have ongoing mood lability throughout the day  Patient with bizarre behaviors including urinating on the floor  Patient has been displaying paranoia toward staff on the unit  Patient intermittently compliant with his psychotropic medications  Patient this morning continues to have flight of ideas during assessment  The patient rambling about how he was picked by a a  from the [facility] for public disturbance  Patient reports that he would and is not bothering anyone  Patient reports that he was upset that the  arrested him in front of the  by his house consist now he wonders what all the kids were thinking  Patient today stating that he would like to get a job working at [employer] or doing security at the facility  Patient lacking any insight into his behaviors prior to admission  Patient denying depression, suicidal ideations, homicidal ideations  Patient lacking insight into his paranoid delusions  Patient denying any auditory or visual hallucinations today  Patient encouraged to comply with his p o  medications        Current medications:    Current Facility-Administered Medications:     acetaminophen (TYLENOL) tablet 650 mg, 650 mg, Oral, Q4H PRN, Daniela Vicente MD    amLODIPine (NORVASC) tablet 10 mg, 10 mg, Oral, Q24H, Daniela Vicente MD, 10 mg at 07/22/18 2033    benztropine (COGENTIN) tablet 1 mg, 1 mg, Oral, Q4H PRN, Christopher Neal PA-C, 1 mg at 07/23/18 0841    diphenhydrAMINE (BENADRYL) injection 50 mg, 50 mg, Intramuscular, Q4H PRN, Christopher Neal PA-C    divalproex sodium (DEPAKOTE ER) 24 hr tablet 500 mg, 500 mg, Oral, Daily, Christopher Neal PA-C, 500 mg at 07/20/18 1610    haloperidol (HALDOL) tablet 5 mg, 5 mg, Oral, Q4H PRN, Christopher Neal PA-C, 5 mg at 07/23/18 0840    haloperidol lactate (HALDOL) injection 5 mg, 5 mg, Intramuscular, Q4H PRN, Christopher Neal BRANDON    hydrOXYzine HCL (ATARAX) tablet 100 mg, 100 mg, Oral, Q6H PRN, Romie Tsai PA-C    ibuprofen (MOTRIN) tablet 400 mg, 400 mg, Oral, Q6H PRN, Romie Tsai PA-C    LORazepam (ATIVAN) 2 mg/mL injection 2 mg, 2 mg, Intramuscular, Q6H PRN, Romie Tsai PA-C    LORazepam (ATIVAN) tablet 1 mg, 1 mg, Oral, Q4H PRN, Romie Tsai PA-C, 1 mg at 07/23/18 0840    melatonin tablet 9 mg, 9 mg, Oral, HS PRN, Romie Tsai PA-C    multivitamin-minerals (CENTRUM) tablet 1 tablet, 1 tablet, Oral, Daily, Tala Rodríguez MD, 1 tablet at 07/21/18 0818    OLANZapine (ZyPREXA) IM injection 10 mg, 10 mg, Intramuscular, Q6H PRN, Rhiannon Oneal PA-C    OLANZapine (ZyPREXA) tablet 10 mg, 10 mg, Oral, TID, Romie Tsai PA-C, 10 mg at 07/20/18 2110    OLANZapine (ZyPREXA) tablet 5 mg, 5 mg, Oral, Q4H PRN, Rhiannon Oneal PA-C    pantoprazole (PROTONIX) EC tablet 40 mg, 40 mg, Oral, Daily Before Breakfast, Tala Rodríguez MD      Objective:     Vital Signs:  Vitals:    07/22/18 2580 07/22/18 2038 07/23/18 0724 07/23/18 0725   BP: 117/89 150/100 132/97 150/100   BP Location: Right arm Left arm Left arm Left arm   Pulse: 77 59 57 59   Resp: 18 18 18 18   Temp:  98 4 °F (36 9 °C) (!) 97 4 °F (36 3 °C)    TempSrc:  Temporal Temporal    SpO2:  100% 98%    Weight: 71 2 kg (157 lb)      Height:             Appearance:  age appropriate and casually dressed   Behavior:  normal   Speech:  normal volume   Mood:  labile   Affect:  increased in intensity   Thought Process:  disorganized and flight of ideas   Thought Content:  delusions  persecutory   Perceptual Disturbances: None   Risk Potential: none   Sensorium:  person, place and time   Cognition:  intact   Consciousness:  alert and awake    Attention: poor   Intellect: average   Insight:  poor   Judgment: poor      Motor Activity: no abnormal movements           Recent Labs:  Results Reviewed     Procedure Component Value Units Date/Time    Urine Microscopic [[id number]]  (Abnormal) Collected:  07/19/18 1759    Lab Status:  Final result Specimen:  Urine from Urine, Clean Catch Updated:  07/19/18 1857     RBC, UA 2-4 (A) /hpf      WBC, UA 4-10 (A) /hpf      Epithelial Cells Occasional /hpf      Bacteria, UA None Seen /hpf     Rapid drug screen, urine [[id number]]  (Normal) Collected:  07/19/18 1759    Lab Status:  Final result Specimen:  Urine from Urine, Clean Catch Updated:  07/19/18 1839     Amph/Meth UR Negative     Barbiturate Ur Negative     Benzodiazepine Urine Negative     Cocaine Urine Negative     Methadone Urine Negative     Opiate Urine Negative     PCP Ur Negative     THC Urine Negative    Narrative:         FOR MEDICAL PURPOSES ONLY  IF CONFIRMATION NEEDED PLEASE CONTACT THE LAB WITHIN 5 DAYS      Drug Screen Cutoff Levels:  AMPHETAMINE/METHAMPHETAMINES  1000 ng/mL  BARBITURATES     200 ng/mL  BENZODIAZEPINES     200 ng/mL  COCAINE      300 ng/mL  METHADONE      300 ng/mL  OPIATES      300 ng/mL  PHENCYCLIDINE     25 ng/mL  THC       50 ng/mL    Ethanol [[id number]]  (Normal) Collected:  07/19/18 1757    Lab Status:  Final result Specimen:  Blood from Arm, Left Updated:  07/19/18 1826     Ethanol Lvl <10 mg/dL     Comprehensive metabolic panel [[id number]]  (Abnormal) Collected:  07/19/18 1757    Lab Status:  Final result Specimen:  Blood from Arm, Left Updated:  07/19/18 1826     Sodium 144 mmol/L      Potassium 3 5 (L) mmol/L      Chloride 106 mmol/L      CO2 28 mmol/L      Anion Gap 10 mmol/L      BUN 19 mg/dL      Creatinine 1 02 mg/dL      Glucose 117 (H) mg/dL      Calcium 9 6 mg/dL      AST 41 U/L      ALT 49 U/L      Alkaline Phosphatase 72 U/L      Total Protein 7 8 g/dL      Albumin 4 6 g/dL      Total Bilirubin 0 60 mg/dL      eGFR 86 ml/min/1 73sq m     Narrative:         National Kidney Disease Education Program recommendations are as follows:  GFR calculation is accurate only with a steady state creatinine  Chronic Kidney disease less than 60 ml/min/1 73 sq  meters  Kidney failure less than 15 ml/min/1 73 sq  meters  UA w Reflex to Microscopic [[id number]]  (Abnormal) Collected:  07/19/18 1759    Lab Status:  Final result Specimen:  Urine from Urine, Clean Catch Updated:  07/19/18 1823     Color, UA Yellow     Clarity, UA Clear     Specific Gravity, UA 1 025     pH, UA 5 0     Leukocytes, UA 25 0 (A)     Nitrite, UA Negative     Protein, UA 30 (1+) (A) mg/dl      Glucose, UA Negative mg/dl      Ketones, UA Negative mg/dl      Bilirubin, UA Negative     Blood, UA 10 0 (A)     UROBILINOGEN UA Negative mg/dL     CBC and differential [[id number]]  (Abnormal) Collected:  07/19/18 1757    Lab Status:  Final result Specimen:  Blood from Arm, Left Updated:  07/19/18 1811     WBC 7 20 Thousand/uL      RBC 4 35 (L) Million/uL      Hemoglobin 13 4 (L) g/dL      Hematocrit 39 8 (L) %      MCV 92 fL      MCH 30 9 pg      MCHC 33 8 g/dL      RDW 14 1 %      MPV 9 0 fL      Platelets 012 Thousands/uL      Neutrophils Relative 58 %      Lymphocytes Relative 32 %      Monocytes Relative 8 %      Eosinophils Relative 2 %      Basophils Relative 1 %      Neutrophils Absolute 4 20 Thousands/µL      Lymphocytes Absolute 2 30 Thousands/µL      Monocytes Absolute 0 60 Thousand/µL      Eosinophils Absolute 0 10 Thousand/µL      Basophils Absolute 0 10 Thousands/µL           I/O Past 24 hours:  No intake/output data recorded  I/O this shift:  In: -   Out: 1 [Stool:1]        Assessment / Plan:     Schizoaffective disorder, bipolar type ([facility] )    Recommended Treatment:      Medication changes:  1) continue to monitor on initiation of Zyprexa and Depakote  Discontinue one-to-one continues observation peer    Non-pharmacological treatments  1) Continue with group therapy, milieu therapy and occupational therapy  Safety  1) Safety/communication plan established targeting dynamic risk factors above      2) Risks, benefits, and possible side effects of medications explained to patient and patient verbalizes understanding  Counseling / Coordination of Care    Total floor / unit time spent today 20 minutes  Greater than 50% of total time was spent with the patient and / or family counseling and / or coordination of care  A description of the counseling / coordination of care  Patient's Rights, confidentiality and exceptions to confidentiality, use of automated medical record, [facility] staff access to medical record, and consent to treatment reviewed      Mannie Traylor PA-C

## 2018-07-23 NOTE — PLAN OF CARE
ANXIETY     Will report anxiety at manageable levels Progressing        DISCHARGE PLANNING     Discharge to home or other facility with appropriate resources Progressing        PSYCHOSIS     Will report no hallucinations or delusions Progressing        SELF CARE DEFICIT     Return ADL status to a safe level of function Progressing

## 2018-07-23 NOTE — PROGRESS NOTES
Client is a+ox3  States his name is "Serjio Harrison," and he is going to be a   He is pleasant upon approach and is visible, yet withdrawn from his peers  Medication and meal compliant  He will continue to be monitored

## 2018-07-23 NOTE — ACP (ADVANCE CARE PLANNING)
Patient slept well throughout the night and did not awaken to request any PRN medications and did not have any negative behaviors during the night  Offered no complaints and showed no signs or symptoms of distress  MHT at his side at all times     No issues related to sleep

## 2018-07-23 NOTE — CONSULTS
Patient was evaluated chart reviewed and case discussed with the team   Patient is familiar to me from his previous stay on the extended acute care unit at [facility]   He has absolutely paranoid refused to talk with me calls himself with a different name "Ann Marie Jackson stating that he is wrong fully committed and arrested by the police absolutely denying the circumstances that led to his most recent hospitalization  He states he was not in outpatient psychiatric treatment but was only seeing a medical doctor and he does not believe that he needs any psychiatric treatment  The needing only lasted a few minutes as he walked away refusing to talk any further as he became very hostile suspicious paranoid and was unable to engage in a given day conversation with him  Therefore I completed 303 commitment papers for further treatment for a pending court commitment due this week   Thank you    Yuni Guy MD  Psychiatric consultant  07/23/2018 11:00 a m  to 11:15 a m
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
## 2018-07-23 NOTE — PROGRESS NOTES
Client has flat affect with irritable edge  Refused 4pm medications  States he will refuse all medications if he "keeps getting harassed to take them " This writer states that we must continue to offer the medications  Educated the client on high blood pressure and that his blood pressure has been elevated lately and may continue to elevate if he refuses his BP medications  Client is refusing his 1600 BP reading as well  Client is unwilling to be educated  He appeared frustrated and walked away from this nurse

## 2018-07-23 NOTE — DISCHARGE INSTR - OTHER ORDERS
If you are experiencing a mental health emergency, you may call the [address] [place] [address] 24 hours a day, 7 days per week at [phone]  In [facility], call [phone]  When you need someone to listen, the [facility] is available for 16 hours a day, 7 days a week, from the time of 7-10am and 2pm-2am   It is not available from the hours of 2am-6am and 10am-2pm  A representative can be reached at [phone]

## 2018-07-24 RX ORDER — BENZTROPINE MESYLATE 0.5 MG/1
0.5 TABLET ORAL 3 TIMES DAILY
Status: DISCONTINUED | OUTPATIENT
Start: 2018-07-24 | End: 2018-08-16 | Stop reason: HOSPADM

## 2018-07-24 RX ORDER — HALOPERIDOL 5 MG/ML
10 INJECTION INTRAMUSCULAR EVERY 4 HOURS PRN
Status: DISCONTINUED | OUTPATIENT
Start: 2018-07-24 | End: 2018-08-16 | Stop reason: HOSPADM

## 2018-07-24 RX ORDER — HALOPERIDOL 5 MG
5 TABLET ORAL 3 TIMES DAILY
Status: DISCONTINUED | OUTPATIENT
Start: 2018-07-24 | End: 2018-07-31

## 2018-07-24 RX ORDER — HALOPERIDOL 5 MG
10 TABLET ORAL EVERY 4 HOURS PRN
Status: DISCONTINUED | OUTPATIENT
Start: 2018-07-24 | End: 2018-08-16 | Stop reason: HOSPADM

## 2018-07-24 RX ADMIN — BENZTROPINE MESYLATE 1 MG: 0.5 TABLET ORAL at 08:26

## 2018-07-24 RX ADMIN — HALOPERIDOL 5 MG: 5 TABLET ORAL at 08:25

## 2018-07-24 RX ADMIN — LORAZEPAM 1 MG: 1 TABLET ORAL at 08:26

## 2018-07-24 RX ADMIN — OLANZAPINE 10 MG: 10 TABLET, FILM COATED ORAL at 21:58
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
## 2018-07-24 NOTE — PROGRESS NOTES
Patient appeared to be sleeping throughout the majority of the shift and was observed on q 15 minute rounds  Around 0200 he awoke and checked the time  He then proceeded to walk around the unit for while and then returned to his room  As of 0430 patient still appears to be sleeping  No issues were noted, and no PRNs were given  Nursing will continue to monitor

## 2018-07-24 NOTE — PROGRESS NOTES
Pt refused 17:00 medications and stated "No! I do not get any more medications until my 10pm Zyprexa"  Pt otherwise pleasant  Pt showered and visible within the common areas of unit throughout afternoon

## 2018-07-24 NOTE — PROGRESS NOTES
Psychiatry Progress Note    Subjective: Interval History     Patient yesterday with periods of irritability when he was not called by the name Star"  Patient with significant paranoia toward staff psychiatrist during assessment  Patient this morning reporting that said psychiatrist is the lover of another psychiatrist in the area  Patient reporting that he will fall off the face of the earth before he would work with the [facility] act team again  Patient lacking insight into the severity of his symptoms and his decompensation without outpatient support  Patient has had mood lability as he is refusing to take any medications other than Haldol, Ativan, Cogentin  Patient however has been taking Zyprexa  Patient refusing Depakote and Protonix as well as his multivitamin  Patient continues to have a disorganized thought process  Patient requires redirection during assessment        Current medications:    Current Facility-Administered Medications:     acetaminophen (TYLENOL) tablet 650 mg, 650 mg, Oral, Q4H PRN, Scott Aguirre MD    amLODIPine (NORVASC) tablet 10 mg, 10 mg, Oral, Q24H, Scott Aguirre MD, 10 mg at 07/22/18 2033    benztropine (COGENTIN) tablet 0 5 mg, 0 5 mg, Oral, TID, Sera James PA-C    benztropine (COGENTIN) tablet 1 mg, 1 mg, Oral, Q4H PRN, Sera James PA-C, 1 mg at 07/24/18 7970    diphenhydrAMINE (BENADRYL) injection 50 mg, 50 mg, Intramuscular, Q4H PRN, Sera James PA-C    haloperidol (HALDOL) tablet 10 mg, 10 mg, Oral, Q4H PRN, Sera James PA-C    haloperidol (HALDOL) tablet 5 mg, 5 mg, Oral, TID, Sera James PA-C, 5 mg at 07/24/18 0825    haloperidol lactate (HALDOL) injection 10 mg, 10 mg, Intramuscular, Q4H PRN, DAVID Solis-C    hydrOXYzine HCL (ATARAX) tablet 100 mg, 100 mg, Oral, Q6H PRN, Sera James PA-C    ibuprofen (MOTRIN) tablet 400 mg, 400 mg, Oral, Q6H PRN, DAVID Solis-ROSA    LORazepam (ATIVAN) 2 mg/mL injection 2 mg, 2 mg, Intramuscular, Q6H PRN, Sarah Sevi[place], PA-C    LORazepam (ATIVAN) tablet 1 mg, 1 mg, Oral, Q4H PRN, Sarah [place], PA-C, 1 mg at 07/24/18 0826    melatonin tablet 9 mg, 9 mg, Oral, HS PRN, Sarah Sevi[place], PA-C    OLANZapine (ZyPREXA) tablet 10 mg, 10 mg, Oral, TID, Sarah [place], PA-C, 10 mg at 07/23/18 2205      Objective:     Vital Signs:  Vitals:    07/23/18 0724 07/23/18 0725 07/24/18 0732 07/24/18 0733   BP: 132/97 150/100 138/94 135/96   BP Location: Left arm Left arm Left arm Left arm   Pulse: 57 59 55 57   Resp: 18 18 16    Temp: (!) 97 4 °F (36 3 °C)  97 6 °F (36 4 °C)    TempSrc: Temporal  Temporal    SpO2: 98%      Weight:       Height:             Appearance:  age appropriate and casually dressed   Behavior:  normal   Speech:  normal volume   Mood:  labile   Affect:  increased in intensity   Thought Process:  disorganized and flight of ideas   Thought Content:  delusions  persecutory   Perceptual Disturbances: None   Risk Potential: none   Sensorium:  person, place and time   Cognition:  intact   Consciousness:  alert and awake    Attention: poor   Intellect: average   Insight:  poor   Judgment: poor      Motor Activity: no abnormal movements           Recent Labs:  Results Reviewed     Procedure Component Value Units Date/Time    Urine Microscopic [[id number]]  (Abnormal) Collected:  07/19/18 1759    Lab Status:  Final result Specimen:  Urine from Urine, Clean Catch Updated:  07/19/18 1857     RBC, UA 2-4 (A) /hpf      WBC, UA 4-10 (A) /hpf      Epithelial Cells Occasional /hpf      Bacteria, UA None Seen /hpf     Rapid drug screen, urine [[id number]]  (Normal) Collected:  07/19/18 1759    Lab Status:  Final result Specimen:  Urine from Urine, Clean Catch Updated:  07/19/18 1839     Amph/Meth UR Negative     Barbiturate Ur Negative     Benzodiazepine Urine Negative     Cocaine Urine Negative     Methadone Urine Negative     Opiate Urine Negative     PCP Ur Negative THC Urine Negative    Narrative:         FOR MEDICAL PURPOSES ONLY  IF CONFIRMATION NEEDED PLEASE CONTACT THE LAB WITHIN 5 DAYS  Drug Screen Cutoff Levels:  AMPHETAMINE/METHAMPHETAMINES  1000 ng/mL  BARBITURATES     200 ng/mL  BENZODIAZEPINES     200 ng/mL  COCAINE      300 ng/mL  METHADONE      300 ng/mL  OPIATES      300 ng/mL  PHENCYCLIDINE     25 ng/mL  THC       50 ng/mL    Ethanol [[id number]]  (Normal) Collected:  07/19/18 1757    Lab Status:  Final result Specimen:  Blood from Arm, Left Updated:  07/19/18 1826     Ethanol Lvl <10 mg/dL     Comprehensive metabolic panel [[id number]]  (Abnormal) Collected:  07/19/18 1757    Lab Status:  Final result Specimen:  Blood from Arm, Left Updated:  07/19/18 1826     Sodium 144 mmol/L      Potassium 3 5 (L) mmol/L      Chloride 106 mmol/L      CO2 28 mmol/L      Anion Gap 10 mmol/L      BUN 19 mg/dL      Creatinine 1 02 mg/dL      Glucose 117 (H) mg/dL      Calcium 9 6 mg/dL      AST 41 U/L      ALT 49 U/L      Alkaline Phosphatase 72 U/L      Total Protein 7 8 g/dL      Albumin 4 6 g/dL      Total Bilirubin 0 60 mg/dL      eGFR 86 ml/min/1 73sq m     Narrative:         National Kidney Disease Education Program recommendations are as follows:  GFR calculation is accurate only with a steady state creatinine  Chronic Kidney disease less than 60 ml/min/1 73 sq  meters  Kidney failure less than 15 ml/min/1 73 sq  meters      UA w Reflex to Microscopic [[id number]]  (Abnormal) Collected:  07/19/18 1759    Lab Status:  Final result Specimen:  Urine from Urine, Clean Catch Updated:  07/19/18 1823     Color, UA Yellow     Clarity, UA Clear     Specific Gravity, UA 1 025     pH, UA 5 0     Leukocytes, UA 25 0 (A)     Nitrite, UA Negative     Protein, UA 30 (1+) (A) mg/dl      Glucose, UA Negative mg/dl      Ketones, UA Negative mg/dl      Bilirubin, UA Negative     Blood, UA 10 0 (A)     UROBILINOGEN UA Negative mg/dL     CBC and differential [[id number]]  (Abnormal) Collected: 07/19/18 1757    Lab Status:  Final result Specimen:  Blood from Arm, Left Updated:  07/19/18 1811     WBC 7 20 Thousand/uL      RBC 4 35 (L) Million/uL      Hemoglobin 13 4 (L) g/dL      Hematocrit 39 8 (L) %      MCV 92 fL      MCH 30 9 pg      MCHC 33 8 g/dL      RDW 14 1 %      MPV 9 0 fL      Platelets 620 Thousands/uL      Neutrophils Relative 58 %      Lymphocytes Relative 32 %      Monocytes Relative 8 %      Eosinophils Relative 2 %      Basophils Relative 1 %      Neutrophils Absolute 4 20 Thousands/µL      Lymphocytes Absolute 2 30 Thousands/µL      Monocytes Absolute 0 60 Thousand/µL      Eosinophils Absolute 0 10 Thousand/µL      Basophils Absolute 0 10 Thousands/µL           I/O Past 24 hours:  I/O last 3 completed shifts:  In: -   Out: 1 [Stool:1]  No intake/output data recorded  Assessment / Plan:     Schizoaffective disorder, bipolar type ([facility] )    Recommended Treatment:      Medication changes:  1) discontinue Depakote  Continue Zyprexa 10 mg t i d   Add Haldol 5 mg t i d  standing with concurrent Cogentin 0 5 mg     Non-pharmacological treatments  1) Continue with group therapy, milieu therapy and occupational therapy  Safety  1) Safety/communication plan established targeting dynamic risk factors above  2) Risks, benefits, and possible side effects of medications explained to patient and patient verbalizes understanding  Counseling / Coordination of Care    Total floor / unit time spent today 20 minutes  Greater than 50% of total time was spent with the patient and / or family counseling and / or coordination of care  A description of the counseling / coordination of care  Patient's Rights, confidentiality and exceptions to confidentiality, use of automated medical record, [facility] staff access to medical record, and consent to treatment reviewed      Tyrese Stewart PA-C

## 2018-07-24 NOTE — PROGRESS NOTES
Nursing approached the patient to pass morning medication, and the patient stated that he was only going to take the medication that was ordered  He was informed that the medication was ordered, but he stated that he wanted to talk to the doctor before taking the medication  He also stated that he was not having any problems with reflux currently and felt has if he did not need the medication  Nursing will continue to monitor

## 2018-07-24 NOTE — PROGRESS NOTES
Pt left group early  Pt announced that he "stares at the sun and blinks" as a coping skill in dealing with symtoms and triggers of mental illness  When therapist addressed and did not endorse this method and suggested positive methods of coping skills and therapeutic interventions,  Pt annouced that he was leaving group

## 2018-07-24 NOTE — NURSING NOTE
Rajan,who wants to be called "Melodie Gillette", said he wants to stay in his room and rest,with his covers pulled up to his neck  He did not wants to talk  When asked how he felt he said,"less then great"

## 2018-07-25 RX ORDER — OLANZAPINE 10 MG/1
10 TABLET ORAL
Status: DISCONTINUED | OUTPATIENT
Start: 2018-07-25 | End: 2018-07-31

## 2018-07-25 RX ADMIN — OLANZAPINE 10 MG: 10 TABLET, FILM COATED ORAL at 22:29

## 2018-07-25 RX ADMIN — BENZTROPINE MESYLATE 0.5 MG: 0.5 TABLET ORAL at 12:45

## 2018-07-25 RX ADMIN — LORAZEPAM 1 MG: 1 TABLET ORAL at 12:45

## 2018-07-25 RX ADMIN — HALOPERIDOL 5 MG: 5 TABLET ORAL at 12:45

## 2018-07-25 NOTE — PROGRESS NOTES
Pt continues to want to be addressed as "Gisella Manjarrez"  Pt refused morning medications and stated, "Oh no, I don;t take anything at this time  I take my Haldol and my cogentin and my ativan at 12:00"  Pt was given same at 12:30  When writer asked patient how the cogentin helps him, pt stated "Well it helps to balance out my antidepressant and my other medication  It balances me"  Pt denies SI's, HI's, AH's, VH's  Pt social with select peers on unit  Will continue to monitor and provide therapeutic support

## 2018-07-25 NOTE — PROGRESS NOTES
Pt was able to sit thru group  However, Pt introduced himself as "Pavel Shoemaker" and that he has not been hospitalized at this hospital before  (This writer has known pt from [facility] admission as Aundrea Turner)  Pt was pleasant but lacked insight into MH needs  Pt reported that he knew his diagnosis but was unsure of medication management strategies  Reinforced medication management strategies and provided information on mental health diagnosis and preparing for discharge needs in the community

## 2018-07-25 NOTE — PLAN OF CARE
Problem: PSYCHOSIS  Goal: Will report no hallucinations or delusions  Interventions: Janna JOLLEY ATR-BC  - Encourage to attend and participate in art therapy once he is off 1:1   - Provide container for psychosis/delusions  - Provide means to improve organization      Outcome: Progressing  Patient began attending art therapy with full participation    Problem: ANXIETY  Goal: Will report anxiety at manageable levels  INTERVENTIONS: Janna JOLLEY ATR-BC  - Encourage to attend and participate in art therapy once he is off 1:1   - Provide means to diffuse, manage and address anxiety  - Explore concepts of ownership, awareness and healthy expression      Outcome: Progressing  Patient began attending art therapy with full participation    Problem: SELF CARE DEFICIT  Goal: Return ADL status to a safe level of function  INTERVENTIONS: Janna JOLLEY ATR-BC  - Encourage to attend and participate in art therapy once he is off 1:1   - Provide means incorporate new coping strategies and self care  - Explore concepts of alternative perspective, awareness  and healthy expression     Outcome: Progressing  Patient began attending art therapy with full participation

## 2018-07-25 NOTE — NURSING NOTE
Patient appeared to sleep for long intervals during the night, getting up briefly on two occassions  Patient slammed door in his room several times for no apparent reason on one occasion but immediately stopped and returned to bed when confronted by staff  Patient also got up to request a room reassignment and was informed that this is not possible at this time  Patient currently appears to be asleep  Will continue to monitor closely on assault and suicide precautions

## 2018-07-25 NOTE — PROGRESS NOTES
Psychiatry Progress Note    Subjective: Interval History     Patient slept that long intervals last evening  Patient with episodes bizarre behaviors as he got up during the night with slam the door for no reason  Patient continued to insist that he be called Star  Patient continues to have flight of ideas during assessment  Patient continues to have lack of insight into his behaviors that led to his admission  Patient yesterday not compliant with his medications throughout the day  Patient reporting that he only wants to take Haldol at lunchtime and Zyprexa at 10:30 a m  in the evening  Patient encouraged to comply with his p o  medication regimen    Patient denying any depression, suicidal ideations, or homicidal ideations,      Current medications:    Current Facility-Administered Medications:     acetaminophen (TYLENOL) tablet 650 mg, 650 mg, Oral, Q4H PRN, Gee Burgess MD    amLODIPine (NORVASC) tablet 10 mg, 10 mg, Oral, Q24H, Gee Burgess MD, 10 mg at 07/22/18 2033    benztropine (COGENTIN) tablet 0 5 mg, 0 5 mg, Oral, TID, Quilliprincess Huffman, PA-C    benztropine (COGENTIN) tablet 1 mg, 1 mg, Oral, Q4H PRN, Quillian Nathanael, PA-C, 1 mg at 07/24/18 4571    diphenhydrAMINE (BENADRYL) injection 50 mg, 50 mg, Intramuscular, Q4H PRN, Quillian Nathanael, PA-C    haloperidol (HALDOL) tablet 10 mg, 10 mg, Oral, Q4H PRN, Quillian Nathanael, PA-C    haloperidol (HALDOL) tablet 5 mg, 5 mg, Oral, TID, Quillian Nathanael, PA-C, 5 mg at 07/24/18 0825    haloperidol lactate (HALDOL) injection 10 mg, 10 mg, Intramuscular, Q4H PRN, Quillian Nathanael, PA-C    hydrOXYzine HCL (ATARAX) tablet 100 mg, 100 mg, Oral, Q6H PRN, Quillian Nathanael, PA-C    ibuprofen (MOTRIN) tablet 400 mg, 400 mg, Oral, Q6H PRN, Quillian Nathanael, PA-C    LORazepam (ATIVAN) 2 mg/mL injection 2 mg, 2 mg, Intramuscular, Q6H PRN, Quillian Nathanael, PA-C    LORazepam (ATIVAN) tablet 1 mg, 1 mg, Oral, Q4H PRN, Angel Huffman PA-C, 1 mg at 07/24/18 0826    melatonin tablet 9 mg, 9 mg, Oral, HS PRN, Scott Gum, PA-C    OLANZapine (ZyPREXA) tablet 10 mg, 10 mg, Oral, HS, Scott Gum, PA-C      Objective:     Vital Signs:  Vitals:    07/24/18 0732 07/24/18 0733 07/25/18 0701 07/25/18 0703   BP: 138/94 135/96 124/92 120/90   BP Location: Left arm Left arm Left arm Left arm   Pulse: 55 57 (!) 49 62   Resp: 16  16 16   Temp: 97 6 °F (36 4 °C)  (!) 96 5 °F (35 8 °C)    TempSrc: Temporal  Temporal    SpO2:   95%    Weight:       Height:             Appearance:  age appropriate and casually dressed   Behavior:  normal   Speech:  normal volume   Mood:  labile   Affect:  increased in intensity   Thought Process:  disorganized and flight of ideas   Thought Content:  delusions  persecutory   Perceptual Disturbances: None   Risk Potential: none   Sensorium:  person, place and time   Cognition:  intact   Consciousness:  alert and awake    Attention: poor   Intellect: average   Insight:  poor   Judgment: poor      Motor Activity: no abnormal movements           Recent Labs:  Results Reviewed     Procedure Component Value Units Date/Time    Urine Microscopic [[id number]]  (Abnormal) Collected:  07/19/18 1759    Lab Status:  Final result Specimen:  Urine from Urine, Clean Catch Updated:  07/19/18 1857     RBC, UA 2-4 (A) /hpf      WBC, UA 4-10 (A) /hpf      Epithelial Cells Occasional /hpf      Bacteria, UA None Seen /hpf     Rapid drug screen, urine [[id number]]  (Normal) Collected:  07/19/18 1759    Lab Status:  Final result Specimen:  Urine from Urine, Clean Catch Updated:  07/19/18 1839     Amph/Meth UR Negative     Barbiturate Ur Negative     Benzodiazepine Urine Negative     Cocaine Urine Negative     Methadone Urine Negative     Opiate Urine Negative     PCP Ur Negative     THC Urine Negative    Narrative:         FOR MEDICAL PURPOSES ONLY  IF CONFIRMATION NEEDED PLEASE CONTACT THE LAB WITHIN 5 DAYS      Drug Screen Cutoff Levels:  AMPHETAMINE/METHAMPHETAMINES  1000 ng/mL  BARBITURATES     200 ng/mL  BENZODIAZEPINES     200 ng/mL  COCAINE      300 ng/mL  METHADONE      300 ng/mL  OPIATES      300 ng/mL  PHENCYCLIDINE     25 ng/mL  THC       50 ng/mL    Ethanol [[id number]]  (Normal) Collected:  07/19/18 1757    Lab Status:  Final result Specimen:  Blood from Arm, Left Updated:  07/19/18 1826     Ethanol Lvl <10 mg/dL     Comprehensive metabolic panel [[id number]]  (Abnormal) Collected:  07/19/18 1757    Lab Status:  Final result Specimen:  Blood from Arm, Left Updated:  07/19/18 1826     Sodium 144 mmol/L      Potassium 3 5 (L) mmol/L      Chloride 106 mmol/L      CO2 28 mmol/L      Anion Gap 10 mmol/L      BUN 19 mg/dL      Creatinine 1 02 mg/dL      Glucose 117 (H) mg/dL      Calcium 9 6 mg/dL      AST 41 U/L      ALT 49 U/L      Alkaline Phosphatase 72 U/L      Total Protein 7 8 g/dL      Albumin 4 6 g/dL      Total Bilirubin 0 60 mg/dL      eGFR 86 ml/min/1 73sq m     Narrative:         National Kidney Disease Education Program recommendations are as follows:  GFR calculation is accurate only with a steady state creatinine  Chronic Kidney disease less than 60 ml/min/1 73 sq  meters  Kidney failure less than 15 ml/min/1 73 sq  meters      UA w Reflex to Microscopic [[id number]]  (Abnormal) Collected:  07/19/18 1759    Lab Status:  Final result Specimen:  Urine from Urine, Clean Catch Updated:  07/19/18 1823     Color, UA Yellow     Clarity, UA Clear     Specific Gravity, UA 1 025     pH, UA 5 0     Leukocytes, UA 25 0 (A)     Nitrite, UA Negative     Protein, UA 30 (1+) (A) mg/dl      Glucose, UA Negative mg/dl      Ketones, UA Negative mg/dl      Bilirubin, UA Negative     Blood, UA 10 0 (A)     UROBILINOGEN UA Negative mg/dL     CBC and differential [[id number]]  (Abnormal) Collected:  07/19/18 1757    Lab Status:  Final result Specimen:  Blood from Arm, Left Updated:  07/19/18 1811     WBC 7 20 Thousand/uL      RBC 4 35 (L) Million/uL Hemoglobin 13 4 (L) g/dL      Hematocrit 39 8 (L) %      MCV 92 fL      MCH 30 9 pg      MCHC 33 8 g/dL      RDW 14 1 %      MPV 9 0 fL      Platelets 845 Thousands/uL      Neutrophils Relative 58 %      Lymphocytes Relative 32 %      Monocytes Relative 8 %      Eosinophils Relative 2 %      Basophils Relative 1 %      Neutrophils Absolute 4 20 Thousands/µL      Lymphocytes Absolute 2 30 Thousands/µL      Monocytes Absolute 0 60 Thousand/µL      Eosinophils Absolute 0 10 Thousand/µL      Basophils Absolute 0 10 Thousands/µL           I/O Past 24 hours:  No intake/output data recorded  No intake/output data recorded  Assessment / Plan:     [facility]oaffective [facility], [facility]    Recommended Treatment:      Medication changes:  1) change Zyprexa to HS dosing  Continue to encourage p o  compliance with t i d  Haldol dosing  Non-pharmacological treatments  1) Continue with group therapy, milieu therapy and occupational therapy  Safety  1) Safety/communication plan established targeting dynamic risk factors above  2) Risks, benefits, and possible side effects of medications explained to patient and patient verbalizes understanding  Counseling / Coordination of Care    Total floor / unit time spent today 20 minutes  Greater than 50% of total time was spent with the patient and / or family counseling and / or coordination of care  A description of the counseling / coordination of care  Patient's Rights, confidentiality and exceptions to confidentiality, use of automated medical record, [facility] staff access to medical record, and consent to treatment reviewed      Jaya Lyons PA-C

## 2018-07-25 NOTE — PROGRESS NOTES
07/24/18 3545   Activity/Group Checklist   Group Other (Comment)  (Art Therapy Process Group / Visual Introduction)   Attendance Attended   Attendance Duration (min) Greater than 60   Interactions Interacted appropriately  (Poor insight)   Affect/Mood Appropriate; Constricted   Goals Achieved Able to listen to others; Able to engage in interactions; Able to manage/cope with feelings; Able to recieve feedback; Able to give feedback to another  (Able to engage art materials and directive)     Artwork reflects self as fragmented, but contained form; possibly agitated with some internal anger present  Attempt to exercise control and order through technique and mirroring  Though easily redirectable, patient comments are often out of context and contain bizarre components, though patient attempts to present as intellectual and informative

## 2018-07-25 NOTE — NURSING NOTE
Received patient at 1900  Patient was cooperative during the evening  Patient was pleasant on approach  Patient still insists on being called "Lupe Metzger " Patient denied anxiety, depression, SI/HI, AH/VH, and pain  Patient refused HS dose of Haldol and Cogentin because "its the wrong time " Patient agreed to discuss medication regimen with provider in the morning  Patient complaint with HS dose of Zyprexa  Patient has appeared to be sleeping for past couple of hours  Will continue to monitor closely on suicide and assault precautions

## 2018-07-26 RX ADMIN — HALOPERIDOL 5 MG: 5 TABLET ORAL at 08:55

## 2018-07-26 RX ADMIN — AMLODIPINE BESYLATE 10 MG: 5 TABLET ORAL at 17:06

## 2018-07-26 RX ADMIN — BENZTROPINE MESYLATE 0.5 MG: 0.5 TABLET ORAL at 08:54

## 2018-07-26 RX ADMIN — HALOPERIDOL 5 MG: 5 TABLET ORAL at 17:05

## 2018-07-26 RX ADMIN — BENZTROPINE MESYLATE 0.5 MG: 0.5 TABLET ORAL at 17:05

## 2018-07-26 NOTE — NURSING NOTE
Patient appeared to sleep for most of the night  Patient was out of bed only briefly to check the time ~0400  Will continue to monitor closely

## 2018-07-26 NOTE — PROGRESS NOTES
Psychiatry Progress Note    Subjective: Interval History     Patient visible on the unit however withdrawn to himself  Patient reports that he slept well last evening  Patient continues to dictate the timing of his medication regimen  Patient refusing morning medications and also refused HS Haldol  Patient ever was compliant with Zyprexa however was suspicious of the tablet prior to compliance  Patient still with bizarre behaviors; still requesting to be called Star  Patient is still guarded and paranoid toward staff and peers  Patient with flight of ideas during assessment  Patient denying depression, suicidal ideations, homicidal ideations  Patient denying any auditory or visual hallucinations  Patient with no somatic complaints today      Current medications:    Current Facility-Administered Medications:     acetaminophen (TYLENOL) tablet 650 mg, 650 mg, Oral, Q4H PRN, Dev Isbell MD    amLODIPine (NORVASC) tablet 10 mg, 10 mg, Oral, Q24H, Dev Isbell MD, 10 mg at 07/22/18 2033    benztropine (COGENTIN) tablet 0 5 mg, 0 5 mg, Oral, TID, Annel Soriano PA-C, 0 5 mg at 07/25/18 1245    benztropine (COGENTIN) tablet 1 mg, 1 mg, Oral, Q4H PRN, Annel Soriano, PA-C, 1 mg at 07/24/18 8468    diphenhydrAMINE (BENADRYL) injection 50 mg, 50 mg, Intramuscular, Q4H PRN, Annel Soriano PA-C    haloperidol (HALDOL) tablet 10 mg, 10 mg, Oral, Q4H PRN, Annel Soriano PA-C    haloperidol (HALDOL) tablet 5 mg, 5 mg, Oral, TID, Annel Soriano, PA-C, 5 mg at 07/25/18 1245    haloperidol lactate (HALDOL) injection 10 mg, 10 mg, Intramuscular, Q4H PRN, Annel Soriano PA-C    hydrOXYzine HCL (ATARAX) tablet 100 mg, 100 mg, Oral, Q6H PRN, Annel Soriano PA-C    ibuprofen (MOTRIN) tablet 400 mg, 400 mg, Oral, Q6H PRN, Annel Soriano PA-C    LORazepam (ATIVAN) 2 mg/mL injection 2 mg, 2 mg, Intramuscular, Q6H PRN, Annel Soriano, PA-C    LORazepam (ATIVAN) tablet 1 mg, 1 mg, Oral, Q4H PRN, Katharina Buster, PA-C, 1 mg at 07/25/18 1245    melatonin tablet 9 mg, 9 mg, Oral, HS PRN, Katharina Buster, PA-C    OLANZapine (ZyPREXA) tablet 10 mg, 10 mg, Oral, HS, Katharina Buster, PA-C, 10 mg at 07/25/18 2229      Objective:     Vital Signs:  Vitals:    07/25/18 0701 07/25/18 0703 07/26/18 0653 07/26/18 0655   BP: 124/92 120/90 120/85 120/85   BP Location: Left arm Left arm Left arm Left arm   Pulse: (!) 49 62 65 57   Resp: 16 16 18 18   Temp: (!) 96 5 °F (35 8 °C)  (!) 96 9 °F (36 1 °C)    TempSrc: Temporal  Temporal    SpO2: 95%  100%    Weight:       Height:             Appearance:  age appropriate and casually dressed   Behavior:  normal   Speech:  normal volume   Mood:  labile   Affect:  increased in intensity   Thought Process:  disorganized and flight of ideas   Thought Content:  delusions  persecutory   Perceptual Disturbances: None   Risk Potential: none   Sensorium:  person, place and time   Cognition:  intact   Consciousness:  alert and awake    Attention: poor   Intellect: average   Insight:  poor   Judgment: poor      Motor Activity: no abnormal movements           Recent Labs:  Results Reviewed     Procedure Component Value Units Date/Time    Urine Microscopic [[id number]]  (Abnormal) Collected:  07/19/18 1759    Lab Status:  Final result Specimen:  Urine from Urine, Clean Catch Updated:  07/19/18 1857     RBC, UA 2-4 (A) /hpf      WBC, UA 4-10 (A) /hpf      Epithelial Cells Occasional /hpf      Bacteria, UA None Seen /hpf     Rapid drug screen, urine [[id number]]  (Normal) Collected:  07/19/18 1759    Lab Status:  Final result Specimen:  Urine from Urine, Clean Catch Updated:  07/19/18 1839     Amph/Meth UR Negative     Barbiturate Ur Negative     Benzodiazepine Urine Negative     Cocaine Urine Negative     Methadone Urine Negative     Opiate Urine Negative     PCP Ur Negative     THC Urine Negative    Narrative:         FOR MEDICAL PURPOSES ONLY     IF CONFIRMATION NEEDED PLEASE CONTACT THE LAB WITHIN 5 DAYS  Drug Screen Cutoff Levels:  AMPHETAMINE/METHAMPHETAMINES  1000 ng/mL  BARBITURATES     200 ng/mL  BENZODIAZEPINES     200 ng/mL  COCAINE      300 ng/mL  METHADONE      300 ng/mL  OPIATES      300 ng/mL  PHENCYCLIDINE     25 ng/mL  THC       50 ng/mL    Ethanol [[id number]]  (Normal) Collected:  07/19/18 1757    Lab Status:  Final result Specimen:  Blood from Arm, Left Updated:  07/19/18 1826     Ethanol Lvl <10 mg/dL     Comprehensive metabolic panel [[id number]]  (Abnormal) Collected:  07/19/18 1757    Lab Status:  Final result Specimen:  Blood from Arm, Left Updated:  07/19/18 1826     Sodium 144 mmol/L      Potassium 3 5 (L) mmol/L      Chloride 106 mmol/L      CO2 28 mmol/L      Anion Gap 10 mmol/L      BUN 19 mg/dL      Creatinine 1 02 mg/dL      Glucose 117 (H) mg/dL      Calcium 9 6 mg/dL      AST 41 U/L      ALT 49 U/L      Alkaline Phosphatase 72 U/L      Total Protein 7 8 g/dL      Albumin 4 6 g/dL      Total Bilirubin 0 60 mg/dL      eGFR 86 ml/min/1 73sq m     Narrative:         National Kidney Disease Education Program recommendations are as follows:  GFR calculation is accurate only with a steady state creatinine  Chronic Kidney disease less than 60 ml/min/1 73 sq  meters  Kidney failure less than 15 ml/min/1 73 sq  meters      UA w Reflex to Microscopic [[id number]]  (Abnormal) Collected:  07/19/18 1759    Lab Status:  Final result Specimen:  Urine from Urine, Clean Catch Updated:  07/19/18 1823     Color, UA Yellow     Clarity, UA Clear     Specific Gravity, UA 1 025     pH, UA 5 0     Leukocytes, UA 25 0 (A)     Nitrite, UA Negative     Protein, UA 30 (1+) (A) mg/dl      Glucose, UA Negative mg/dl      Ketones, UA Negative mg/dl      Bilirubin, UA Negative     Blood, UA 10 0 (A)     UROBILINOGEN UA Negative mg/dL     CBC and differential [[id number]]  (Abnormal) Collected:  07/19/18 1757    Lab Status:  Final result Specimen:  Blood from Arm, Left Updated:  07/19/18 1811 WBC 7 20 Thousand/uL      RBC 4 35 (L) Million/uL      Hemoglobin 13 4 (L) g/dL      Hematocrit 39 8 (L) %      MCV 92 fL      MCH 30 9 pg      MCHC 33 8 g/dL      RDW 14 1 %      MPV 9 0 fL      Platelets 593 Thousands/uL      Neutrophils Relative 58 %      Lymphocytes Relative 32 %      Monocytes Relative 8 %      Eosinophils Relative 2 %      Basophils Relative 1 %      Neutrophils Absolute 4 20 Thousands/µL      Lymphocytes Absolute 2 30 Thousands/µL      Monocytes Absolute 0 60 Thousand/µL      Eosinophils Absolute 0 10 Thousand/µL      Basophils Absolute 0 10 Thousands/µL           I/O Past 24 hours:  No intake/output data recorded  No intake/output data recorded  Assessment / Plan:     [facility]    Recommended Treatment:      Medication changes:  Continue current psychotropic medication regimen  Patient continues to be encouraged to comply with p o  medications  Non-pharmacological treatments  1) Continue with group therapy, milieu therapy and occupational therapy  Safety  1) Safety/communication plan established targeting dynamic risk factors above  2) Risks, benefits, and possible side effects of medications explained to patient and patient verbalizes understanding  Counseling / Coordination of Care    Total floor / unit time spent today 20 minutes  Greater than 50% of total time was spent with the patient and / or family counseling and / or coordination of care  A description of the counseling / coordination of care  Patient's Rights, confidentiality and exceptions to confidentiality, use of automated medical record, [facility] staff access to medical record, and consent to treatment reviewed      Carmen Whitaker PA-C

## 2018-07-26 NOTE — NURSING NOTE
Received patient at 1900  Patient was isolative to room and bed throughout the evening with no behavioral outbursts  Patient pleasant on approach  Patient denied anxiety, depression, SI/HI, AH/VH, and pain  Patient continues to refuse HS Haldol and Cogentin because he is "supposed to take it during the day "  Patient compliant with HS dose of Zyprexa but carefully examined pill before taking it  [facility] Patient has appeared to be sleeping for the past couple of hours  Will continue to monitor closely on suicide and assault precautions

## 2018-07-26 NOTE — PROGRESS NOTES
Patient is still having bizarre behavior His thinking is way off and can get annoyed at anyone for no reason Patient thinks he has no problems Will not admit to his pproblems at this time

## 2018-07-27 RX ADMIN — BENZTROPINE MESYLATE 0.5 MG: 0.5 TABLET ORAL at 17:27

## 2018-07-27 RX ADMIN — AMLODIPINE BESYLATE 10 MG: 5 TABLET ORAL at 17:26

## 2018-07-27 RX ADMIN — BENZTROPINE MESYLATE 0.5 MG: 0.5 TABLET ORAL at 12:34

## 2018-07-27 RX ADMIN — HALOPERIDOL 5 MG: 5 TABLET ORAL at 12:34

## 2018-07-27 RX ADMIN — LORAZEPAM 1 MG: 1 TABLET ORAL at 12:38

## 2018-07-27 RX ADMIN — HALOPERIDOL 5 MG: 5 TABLET ORAL at 17:27

## 2018-07-27 NOTE — PROGRESS NOTES
Psychiatry Progress Note    Subjective: Interval History     Patient continues to have ongoing mood lability  Patient became extremely agitated was staff member last evening who was attempting to provide his medications  Patient reported to be yelling and threatening her calling her derogatory names  Patient then proceeded to have an episode of spitting on his roommate  Patient's roommate became very upset and had to come to staff  Patient this morning with flight of ideas  Patient's speaking about how there is a Rin Bleacher and Stephanie Redd that live in [place]  Patient stating that they are of his long mixed jordana in questioning the Jordana of the attending psychiatrist   Patient be redirected from this topic  Patient then stating that he is worried about missing a follow-up appointment with a Dr Asif Okeefe on [address] as he has been having issues with his ankle and is worried that he has elephantitis  Patient then speaking about a [employer] agent named Parvin Colvin, who was not the fbi agent he reports picked him up  Patient continues to lack insight into his need for treatment as he continues to not be compliant with his Haldol dosing t i d   Patient also inquiring about discharge      Current medications:    Current Facility-Administered Medications:     acetaminophen (TYLENOL) tablet 650 mg, 650 mg, Oral, Q4H PRN, Bryon Starkey MD    amLODIPine (NORVASC) tablet 10 mg, 10 mg, Oral, Q24H, Bryon Starkey MD, 10 mg at 07/26/18 1706    benztropine (COGENTIN) tablet 0 5 mg, 0 5 mg, Oral, TID, Tommy Blakeins, PA-C, 0 5 mg at 07/26/18 1705    benztropine (COGENTIN) tablet 1 mg, 1 mg, Oral, Q4H PRN, Tommy Cumins, PA-C, 1 mg at 07/24/18 7318    diphenhydrAMINE (BENADRYL) injection 50 mg, 50 mg, Intramuscular, Q4H PRN, Tommy Soriano, PA-C    haloperidol (HALDOL) tablet 10 mg, 10 mg, Oral, Q4H PRN, Tommy Cumins, PA-C    haloperidol (HALDOL) tablet 5 mg, 5 mg, Oral, TID, Tommyralf Blakeins, PA-C, 5 mg at 07/26/18 1705    haloperidol lactate (HALDOL) injection 10 mg, 10 mg, Intramuscular, Q4H PRN, Haven Joseph, PA-C    hydrOXYzine HCL (ATARAX) tablet 100 mg, 100 mg, Oral, Q6H PRN, Haven Joseph, PA-C    ibuprofen (MOTRIN) tablet 400 mg, 400 mg, Oral, Q6H PRN, Haven Joseph, PA-C    LORazepam (ATIVAN) 2 mg/mL injection 2 mg, 2 mg, Intramuscular, Q6H PRN, Haven Joseph, PA-C    LORazepam (ATIVAN) tablet 1 mg, 1 mg, Oral, Q4H PRN, Haven Joseph, PA-C, 1 mg at 07/25/18 1245    melatonin tablet 9 mg, 9 mg, Oral, HS PRN, Haven Joseph, PA-C    OLANZapine (ZyPREXA) tablet 10 mg, 10 mg, Oral, HS, Haven Joseph, PA-C, 10 mg at 07/25/18 2229      Objective:     Vital Signs:  Vitals:    07/26/18 0653 07/26/18 0655 07/27/18 0630 07/27/18 0631   BP: 120/85 120/85 121/90 129/90   BP Location: Left arm Left arm Left arm Left arm   Pulse: 65 57 70 77   Resp: 18 18 18 18   Temp: (!) 96 9 °F (36 1 °C)  97 8 °F (36 6 °C)    TempSrc: Temporal  Temporal    SpO2: 100%  95%    Weight:       Height:             Appearance:  age appropriate and casually dressed   Behavior:  normal   Speech:  normal volume   Mood:  labile   Affect:  increased in intensity   Thought Process:  disorganized and flight of ideas   Thought Content:  delusions  persecutory   Perceptual Disturbances: None   Risk Potential: none   Sensorium:  person, place and time   Cognition:  intact   Consciousness:  alert and awake    Attention: poor   Intellect: average   Insight:  poor   Judgment: poor      Motor Activity: no abnormal movements           Recent Labs:  Results Reviewed     Procedure Component Value Units Date/Time    Urine Microscopic [[id number]]  (Abnormal) Collected:  07/19/18 3399    Lab Status:  Final result Specimen:  Urine from Urine, Clean Catch Updated:  07/19/18 1857     RBC, UA 2-4 (A) /hpf      WBC, UA 4-10 (A) /hpf      Epithelial Cells Occasional /hpf      Bacteria, UA None Seen /hpf     Rapid drug screen, urine [[id number]]  (Normal) Collected:  07/19/18 1759    Lab Status:  Final result Specimen:  Urine from Urine, Clean Catch Updated:  07/19/18 1839     Amph/Meth UR Negative     Barbiturate Ur Negative     Benzodiazepine Urine Negative     Cocaine Urine Negative     Methadone Urine Negative     Opiate Urine Negative     PCP Ur Negative     THC Urine Negative    Narrative:         FOR MEDICAL PURPOSES ONLY  IF CONFIRMATION NEEDED PLEASE CONTACT THE LAB WITHIN 5 DAYS  Drug Screen Cutoff Levels:  AMPHETAMINE/METHAMPHETAMINES  1000 ng/mL  BARBITURATES     200 ng/mL  BENZODIAZEPINES     200 ng/mL  COCAINE      300 ng/mL  METHADONE      300 ng/mL  OPIATES      300 ng/mL  PHENCYCLIDINE     25 ng/mL  THC       50 ng/mL    Ethanol [[id number]]  (Normal) Collected:  07/19/18 1757    Lab Status:  Final result Specimen:  Blood from Arm, Left Updated:  07/19/18 1826     Ethanol Lvl <10 mg/dL     Comprehensive metabolic panel [[id number]]  (Abnormal) Collected:  07/19/18 1757    Lab Status:  Final result Specimen:  Blood from Arm, Left Updated:  07/19/18 1826     Sodium 144 mmol/L      Potassium 3 5 (L) mmol/L      Chloride 106 mmol/L      CO2 28 mmol/L      Anion Gap 10 mmol/L      BUN 19 mg/dL      Creatinine 1 02 mg/dL      Glucose 117 (H) mg/dL      Calcium 9 6 mg/dL      AST 41 U/L      ALT 49 U/L      Alkaline Phosphatase 72 U/L      Total Protein 7 8 g/dL      Albumin 4 6 g/dL      Total Bilirubin 0 60 mg/dL      eGFR 86 ml/min/1 73sq m     Narrative:         National Kidney Disease Education Program recommendations are as follows:  GFR calculation is accurate only with a steady state creatinine  Chronic Kidney disease less than 60 ml/min/1 73 sq  meters  Kidney failure less than 15 ml/min/1 73 sq  meters      UA w Reflex to Microscopic [[id number]]  (Abnormal) Collected:  07/19/18 1759    Lab Status:  Final result Specimen:  Urine from Urine, Clean Catch Updated:  07/19/18 1823     Color, UA Yellow     Clarity, UA Clear Specific Gravity, UA 1 025     pH, UA 5 0     Leukocytes, UA 25 0 (A)     Nitrite, UA Negative     Protein, UA 30 (1+) (A) mg/dl      Glucose, UA Negative mg/dl      Ketones, UA Negative mg/dl      Bilirubin, UA Negative     Blood, UA 10 0 (A)     UROBILINOGEN UA Negative mg/dL     CBC and differential [[id number]]  (Abnormal) Collected:  07/19/18 1757    Lab Status:  Final result Specimen:  Blood from Arm, Left Updated:  07/19/18 1811     WBC 7 20 Thousand/uL      RBC 4 35 (L) Million/uL      Hemoglobin 13 4 (L) g/dL      Hematocrit 39 8 (L) %      MCV 92 fL      MCH 30 9 pg      MCHC 33 8 g/dL      RDW 14 1 %      MPV 9 0 fL      Platelets 585 Thousands/uL      Neutrophils Relative 58 %      Lymphocytes Relative 32 %      Monocytes Relative 8 %      Eosinophils Relative 2 %      Basophils Relative 1 %      Neutrophils Absolute 4 20 Thousands/µL      Lymphocytes Absolute 2 30 Thousands/µL      Monocytes Absolute 0 60 Thousand/µL      Eosinophils Absolute 0 10 Thousand/µL      Basophils Absolute 0 10 Thousands/µL           I/O Past 24 hours:  No intake/output data recorded  No intake/output data recorded  Assessment / Plan:     Schizoaffective disorder, bipolar [facility] ([facility] )    Recommended Treatment:      Medication changes:  Continue current psychotropic medication regimen  Patient continues to be encouraged to comply with p o  medications  Non-pharmacological treatments  1) Continue with group therapy, milieu therapy and occupational therapy  Safety  1) Safety/communication plan established targeting dynamic risk factors above  2) Risks, benefits, and possible side effects of medications explained to patient and patient verbalizes understanding  Counseling / Coordination of Care    Total floor / unit time spent today 20 minutes  Greater than 50% of total time was spent with the patient and / or family counseling and / or coordination of care   A description of the counseling / coordination of care      Patient's Rights, confidentiality and exceptions to confidentiality, use of automated medical record, [facility] staff access to medical record, and consent to treatment reviewed      Felipe Lawrence PA-C

## 2018-07-27 NOTE — NURSING NOTE
Patient is cooperative with meals  Patient adamantly refused to take 0900 meds, stating, "I do not take meds at this time, I take my meds at 12 and one at 1030 "  Patient later requested his 0900 meds after lunch and took after examining meds  Patient requested Ativan, denies anxiety or depression, stating he wants it to help him rest   Pt did attend morning group and got loud and cursed, was redirectable  Later attended art therapy without any issues  Pt is visible on unit, inconsistent with peer interaction, appropriate to some and not others  Pt will be monitored closely  Denies suicidal ideation  Pt encouraged to talk to staff if any issues arise

## 2018-07-27 NOTE — NURSING NOTE
Patient took a nap through meds and requested his meds after dinner  Pt allowed RN to take his BP after refusing the first time asked  Pt was cooperative with meds after an initial hesitation over the Norvasc stating, "I didn't get this yesterday, I don't take this "  After pt was notified he had received it yesterday he stated, "then someone must have snuck it in on me "  Pt is visible on unit and is presently in his room  Denies SI, anxiety or depression at this time  Denies pain  Will cont to monitor closely

## 2018-07-27 NOTE — PROGRESS NOTES
Pt was sleeping when it was time to give him his bedtime medications  This writer went into the room to give the pt his medications  This pt woke up with a startled reaction and began screaming at this writer " GET OUT, GET OUT"  This writer then walked away  The pt woke up a few people with how loud he screamed and he could be heard at the nursing station  The MHT then goes to check on him and he states that he does not take any medications  After this writer walks out the room due to him screaming at her, the pt's roommate comes out approximately 10 minutes after stating that the pt was spitting on him  His roommate was moved to the observation room for the night  Will continue to monitor

## 2018-07-27 NOTE — PLAN OF CARE
Problem: DISCHARGE PLANNING  Goal: Discharge to home or other facility with appropriate resources  INTERVENTIONS:  - Identify barriers to discharge w/patient and caregiver  - Arrange for needed discharge resources and transportation as appropriate  - Identify discharge learning needs (meds, wound care, etc )  - Arrange for interpretive services to assist at discharge as needed  -Patient will return home  - will arrange outpatient psychiatric services  - Refer to Case Management Department for coordinating discharge planning if the patient needs post-hospital services based on physician/advanced practitioner order or complex needs related to functional status, cognitive ability, or social support system    Outcome: Not Progressing  Patient still displaying bizarre behaviors   is unable to determine what services is patient is interested upon discharge  Patient is able to relay that he does not want another ACT team    will meet with patient to discuss discharge planning at a later time when he is more stable

## 2018-07-27 NOTE — PROGRESS NOTES
Pt was sleeping the entire night and did not wake up at all to request any PRNs  Will continue to monitor

## 2018-07-28 RX ADMIN — LORAZEPAM 1 MG: 1 TABLET ORAL at 19:53

## 2018-07-28 RX ADMIN — HALOPERIDOL 10 MG: 5 TABLET ORAL at 19:53

## 2018-07-28 RX ADMIN — BENZTROPINE MESYLATE 1 MG: 0.5 TABLET ORAL at 19:52

## 2018-07-28 NOTE — PLAN OF CARE
ANXIETY     Will report anxiety at manageable levels Not Progressing        PSYCHOSIS     Will report no hallucinations or delusions Not Progressing          Pt with severe agitation and refused morning medications  Pt stated in loud tone, "And do not come and ask me if I need anything!!! If I do, I will come to you  You should assume that when I don' t ask for something, I don't want anything!"  Pt with labile mood and goes from loud and aggressive with slamming doors, to friendly and laughing with peers  Pt refusing to talk about current symptoms  Will continue to monitor and provide therapeutic support

## 2018-07-28 NOTE — PROGRESS NOTES
Pt les irritable than this morning, although continues to refuse medications  Pt isolative to self and room for most of day, although when pt does come out of room, he is pleasant to peers  Will continue to monitor

## 2018-07-28 NOTE — PLAN OF CARE
Problem: PSYCHOSIS  Goal: Will report no hallucinations or delusions  Interventions: Melany JOLLEY ATR-BC  - Encourage to attend and participate in art therapy once he is off 1:1   - Provide container for psychosis/delusions  - Provide means to improve organization      Outcome: Progressing  Patient began attending art therapy with full participation  Artwork reflects psychosis and delusions with themes of internalized aggression and agitation  Problem: ANXIETY  Goal: Will report anxiety at manageable levels  INTERVENTIONS: Melany JOLLEY ATR-BC  - Encourage to attend and participate in art therapy once he is off 1:1   - Provide means to diffuse, manage and address anxiety  - Explore concepts of ownership, awareness and healthy expression      Outcome: Progressing  Patient began attending art therapy with full participation  Problem: SELF CARE DEFICIT  Goal: Return ADL status to a safe level of function  INTERVENTIONS: Melany JOLLEY ATR-BC  - Encourage to attend and participate in art therapy once he is off 1:1   - Provide means incorporate new coping strategies and self care  - Explore concepts of alternative perspective, awareness  and healthy expression     Outcome: Not Progressing  Patient began attending art therapy with full participation, however, continues to struggle with psychosis and delusions  Patient is not open to gaining awareness or new coping strategies at this time

## 2018-07-28 NOTE — PROGRESS NOTES
Pt was sleeping during bedtime medications  This pt gets very angry and upset if woken up as per last night's incident  Pt did not receive his Cogentin, Haldol, and Zyprexa tonight  Will continue to monitor

## 2018-07-28 NOTE — PROGRESS NOTES
Psychiatry Progress Note    Subjective: Interval History     The patient is very angry this morning  He is yelling at anybody that comes toward his room  He has his night stand against his door  Patient slamming door and cursing  Patient states he will not be taking medications and he will only ask for them  He does not want anybody to come with medications to him  Patient was drawing aggressive things in art therapy such as a [place] holding a gun  Patient not allowing me to come into his room this morning and states he does not want to talk with me        Current medications:    Current Facility-Administered Medications:     acetaminophen (TYLENOL) tablet 650 mg, 650 mg, Oral, Q4H PRN, Jevon Gilmore MD    amLODIPine (NORVASC) tablet 10 mg, 10 mg, Oral, Q24H, Jevon Gilmore MD, 10 mg at 07/27/18 1726    benztropine (COGENTIN) tablet 0 5 mg, 0 5 mg, Oral, TID, Marshall Hardy PA-C, 0 5 mg at 07/27/18 1727    benztropine (COGENTIN) tablet 1 mg, 1 mg, Oral, Q4H PRN, Marshall Hardy PA-C, 1 mg at 07/24/18 2070    diphenhydrAMINE (BENADRYL) injection 50 mg, 50 mg, Intramuscular, Q4H PRN, DAVID Chris-C    haloperidol (HALDOL) tablet 10 mg, 10 mg, Oral, Q4H PRN, Marshall Hardy PA-C    haloperidol (HALDOL) tablet 5 mg, 5 mg, Oral, TID, Marshall Hardy PA-C, 5 mg at 07/27/18 1727    haloperidol lactate (HALDOL) injection 10 mg, 10 mg, Intramuscular, Q4H PRN, Marshall Hardy PA-C    hydrOXYzine HCL (ATARAX) tablet 100 mg, 100 mg, Oral, Q6H PRN, DAVID Chris-C    ibuprofen (MOTRIN) tablet 400 mg, 400 mg, Oral, Q6H PRN, Marshall Hardy PA-C    LORazepam (ATIVAN) 2 mg/mL injection 2 mg, 2 mg, Intramuscular, Q6H PRN, DAVID Chris-C    LORazepam (ATIVAN) tablet 1 mg, 1 mg, Oral, Q4H PRN, Marshall Hardy PA-C, 1 mg at 07/27/18 1238    melatonin tablet 9 mg, 9 mg, Oral, HS PRN, Marshall Hardy PA-C    OLANZapine (ZyPREXA) tablet 10 mg, 10 mg, Oral, HS, Jada Shin BRANDON Haque, 10 mg at 07/25/18 2229      Objective:     Vital Signs:  Vitals:    07/26/18 0655 07/27/18 0630 07/27/18 0631 07/27/18 1715   BP: 120/85 121/90 129/90 123/80   BP Location: Left arm Left arm Left arm Left arm   Pulse: 57 70 77 63   Resp: 18 18 18 20   Temp:  97 8 °F (36 6 °C)  97 6 °F (36 4 °C)   TempSrc:  Temporal  Temporal   SpO2:  95%     Weight:       Height:             Appearance:  age appropriate and casually dressed   Behavior:  guarded and psychomotor agitation   Speech:  loud and tangential   Mood:  irritable and labile   Affect:  labile   Thought Process:  circumstantial and disorganized   Thought Content:  normal   Perceptual Disturbances: None   Risk Potential: none   Sensorium:  person, place, situation and time   Cognition:  intact   Consciousness:  alert and awake    Attention: attention span appeared shorter than expected for age   Intellect: average   Insight:  poor   Judgment: poor      Motor Activity: no abnormal movements           Recent Labs:  Results Reviewed     Procedure Component Value Units Date/Time    Urine Microscopic [[id number]]  (Abnormal) Collected:  07/19/18 1759    Lab Status:  Final result Specimen:  Urine from Urine, Clean Catch Updated:  07/19/18 1857     RBC, UA 2-4 (A) /hpf      WBC, UA 4-10 (A) /hpf      Epithelial Cells Occasional /hpf      Bacteria, UA None Seen /hpf     Rapid drug screen, urine [[id number]]  (Normal) Collected:  07/19/18 1759    Lab Status:  Final result Specimen:  Urine from Urine, Clean Catch Updated:  07/19/18 1839     Amph/Meth UR Negative     Barbiturate Ur Negative     Benzodiazepine Urine Negative     Cocaine Urine Negative     Methadone Urine Negative     Opiate Urine Negative     PCP Ur Negative     THC Urine Negative    Narrative:         FOR MEDICAL PURPOSES ONLY  IF CONFIRMATION NEEDED PLEASE CONTACT THE LAB WITHIN 5 DAYS      Drug Screen Cutoff Levels:  AMPHETAMINE/METHAMPHETAMINES  1000 ng/mL  BARBITURATES     200 ng/mL  BENZODIAZEPINES     200 ng/mL  COCAINE      300 ng/mL  METHADONE      300 ng/mL  OPIATES      300 ng/mL  PHENCYCLIDINE     25 ng/mL  THC       50 ng/mL    Ethanol [[id number]]  (Normal) Collected:  07/19/18 1757    Lab Status:  Final result Specimen:  Blood from Arm, Left Updated:  07/19/18 1826     Ethanol Lvl <10 mg/dL     Comprehensive metabolic panel [[id number]]  (Abnormal) Collected:  07/19/18 1757    Lab Status:  Final result Specimen:  Blood from Arm, Left Updated:  07/19/18 1826     Sodium 144 mmol/L      Potassium 3 5 (L) mmol/L      Chloride 106 mmol/L      CO2 28 mmol/L      Anion Gap 10 mmol/L      BUN 19 mg/dL      Creatinine 1 02 mg/dL      Glucose 117 (H) mg/dL      Calcium 9 6 mg/dL      AST 41 U/L      ALT 49 U/L      Alkaline Phosphatase 72 U/L      Total Protein 7 8 g/dL      Albumin 4 6 g/dL      Total Bilirubin 0 60 mg/dL      eGFR 86 ml/min/1 73sq m     Narrative:         National Kidney Disease Education Program recommendations are as follows:  GFR calculation is accurate only with a steady state creatinine  Chronic Kidney disease less than 60 ml/min/1 73 sq  meters  Kidney failure less than 15 ml/min/1 73 sq  meters      UA w Reflex to Microscopic [[id number]]  (Abnormal) Collected:  07/19/18 1759    Lab Status:  Final result Specimen:  Urine from Urine, Clean Catch Updated:  07/19/18 1823     Color, UA Yellow     Clarity, UA Clear     Specific Gravity, UA 1 025     pH, UA 5 0     Leukocytes, UA 25 0 (A)     Nitrite, UA Negative     Protein, UA 30 (1+) (A) mg/dl      Glucose, UA Negative mg/dl      Ketones, UA Negative mg/dl      Bilirubin, UA Negative     Blood, UA 10 0 (A)     UROBILINOGEN UA Negative mg/dL     CBC and differential [[id number]]  (Abnormal) Collected:  07/19/18 1757    Lab Status:  Final result Specimen:  Blood from Arm, Left Updated:  07/19/18 1811     WBC 7 20 Thousand/uL      RBC 4 35 (L) Million/uL      Hemoglobin 13 4 (L) g/dL      Hematocrit 39 8 (L) %      MCV 92 fL MCH 30 9 pg      MCHC 33 8 g/dL      RDW 14 1 %      MPV 9 0 fL      Platelets 332 Thousands/uL      Neutrophils Relative 58 %      Lymphocytes Relative 32 %      Monocytes Relative 8 %      Eosinophils Relative 2 %      Basophils Relative 1 %      Neutrophils Absolute 4 20 Thousands/µL      Lymphocytes Absolute 2 30 Thousands/µL      Monocytes Absolute 0 60 Thousand/µL      Eosinophils Absolute 0 10 Thousand/µL      Basophils Absolute 0 10 Thousands/µL           I/O Past 24 hours:  I/O last 3 completed shifts:  In: -   Out: 1 [Stool:1]  No intake/output data recorded  Assessment / Plan:     Schizoaffective disorder, bipolar [facility] ([facility]    Recommended Treatment:      Medication changes:  1) Continue current medication regimen  Patient is refusing medication today  Non-pharmacological treatments  1) Continue with group therapy, milieu therapy and occupational therapy  Safety  1) Safety/communication plan established targeting dynamic risk factors above  2) Risks, benefits, and possible side effects of medications explained to patient and patient verbalizes understanding  Counseling / Coordination of Care    Total floor / unit time spent today 20 minutes  Greater than 50% of total time was spent with the patient and / or family counseling and / or coordination of care  A description of the counseling / coordination of care  Patient's Rights, confidentiality and exceptions to confidentiality, use of automated medical record, [facility] staff access to medical record, and consent to treatment reviewed      Roni Gonsales PA-C

## 2018-07-29 RX ADMIN — BENZTROPINE MESYLATE 0.5 MG: 0.5 TABLET ORAL at 09:34

## 2018-07-29 RX ADMIN — HALOPERIDOL 5 MG: 5 TABLET ORAL at 09:34

## 2018-07-29 RX ADMIN — LORAZEPAM 1 MG: 1 TABLET ORAL at 09:34

## 2018-07-29 RX ADMIN — OLANZAPINE 10 MG: 10 TABLET, FILM COATED ORAL at 22:28

## 2018-07-29 NOTE — PLAN OF CARE
PSYCHOSIS     Will report no hallucinations or delusions Not Progressing          ANXIETY     Will report anxiety at manageable levels Progressing        SELF CARE DEFICIT     Return ADL status to a safe level of function Progressing          Pt with pleasant mood this morning with no verbal or physical outbursts  Pt reported that he took his Haldol and cogentin and ativan last night and used that as reason why he didn't need to take medications this morning  Pt reported that because he shaved, he had an overall improved mood  Writer encouraged pt to take his morning medication as prescribed and pt complied and pt also requested and given Ativan 1mg po prn  Pt reported he slept like a child last night and was with good humor  Pt spent most of morning in room  Pt reported that he left his 72 hour notice under door of psychiatric practitioner's door  Writer reminded patient that he is court committed and may not leave in 72 hours  Pt stated, "I am worried that if I don't leave soon, I may lose some things that I don;t want to give details away about to you"  Pt was encouraged to speak to author or  if there was anything that staff could do to prevent him from losing said things during hospitalization  Pt reported his  is "Sean Winters" and he has known her for a long time and she has been helpful to him  MHT reported that patient was filling out menu and pt stated to MHT, "I know why you are watching me   you want to see if I am going to sign my name as Lacretia Rotunda or as [place] Friend" and laughed and wrote "[place] Friend"  Will continue to monitor and provide therapeutic support

## 2018-07-29 NOTE — PROGRESS NOTES
Patient started an issue with peer after having a bad phone call  He spit at this peer  Later in the shift, patient picked up a chair and charged at peer with it  Patient was extremely agitated and aggressive  Called Darnell [facility] at [phone]  Pt was redirected to observation room, were able to talk patient into taking medications PO rather than IM  Patient fell asleep after that  Unable to assess pt rest of shift  Patient was given Ativan, Cogentin, and Haldol at [address]  Pt sleeping in observation room  Will continue to monitor

## 2018-07-29 NOTE — NURSING NOTE
Received pt in bed in the observation room at change of shift with eyes closed chest movement noted  Pt awake at this time and went to his room  No behavioral problem  Will continues to monitor  SP1, Assault and Elopement precaution in progress as well as q 15 min   Room checks

## 2018-07-29 NOTE — PROGRESS NOTES
Pt refused 17:00 medications and reported that he doesn't need his blood pressure medications because "there is nothing wrong with my blood pressure"  Pt also refused Haldol and cogentin and reported he will take his Zyprexa 10mg at 22:30 this evening  Pt remained pleasant for most of day and was laughing with peers when pt came out of room before meals  Will continue to monitor and provide therapeutic support

## 2018-07-29 NOTE — PROGRESS NOTES
Psychiatry Progress Note    Subjective: Interval History     The patient has an irritable edge  The patient was agitated and angry yesterday and a code gray was called at 7:45 p m     The patient threw a chair  He was escorted to his room and then agreed to take his medications  He was isolative yesterday and only out for meals  Patient was social with peers later in the evening  Prior to this the patient was noted to be on the phone and yelling and cursing  The patient also spit towards another patient as he walked by  Patient did agree to take his medications this morning  He is very guarded upon approach and keeps walking away during questioning        Current medications:    Current Facility-Administered Medications:     acetaminophen (TYLENOL) tablet 650 mg, 650 mg, Oral, Q4H PRN, Sue Rockwell MD    amLODIPine (NORVASC) tablet 10 mg, 10 mg, Oral, Q24H, Sue Rockwell MD, 10 mg at 07/27/18 1726    benztropine (COGENTIN) tablet 0 5 mg, 0 5 mg, Oral, TID, Blaise Wade PA-C, 0 5 mg at 07/29/18 0934    benztropine (COGENTIN) tablet 1 mg, 1 mg, Oral, Q4H PRN, Syedaella Sheri PA-C, 1 mg at 07/28/18 1952    diphenhydrAMINE (BENADRYL) injection 50 mg, 50 mg, Intramuscular, Q4H PRN, Blaise Wade PA-C    haloperidol (HALDOL) tablet 10 mg, 10 mg, Oral, Q4H PRN, Blaise Wade PA-C, 10 mg at 07/28/18 1953    haloperidol (HALDOL) tablet 5 mg, 5 mg, Oral, TID, Syedaella Sheri PA-C, 5 mg at 07/29/18 6199    haloperidol lactate (HALDOL) injection 10 mg, 10 mg, Intramuscular, Q4H PRN, Blaise Wade PA-C    hydrOXYzine HCL (ATARAX) tablet 100 mg, 100 mg, Oral, Q6H PRN, Blaise Wade PA-C    ibuprofen (MOTRIN) tablet 400 mg, 400 mg, Oral, Q6H PRN, DAVID Weiss-C    LORazepam (ATIVAN) 2 mg/mL injection 2 mg, 2 mg, Intramuscular, Q6H PRN, Blaise Wade PA-C    LORazepam (ATIVAN) tablet 1 mg, 1 mg, Oral, Q4H PRN, Blaise Wade PA-C, 1 mg at 07/29/18 0934    melatonin tablet 9 mg, 9 mg, Oral, HS PRN, Blaise Wade PA-C    OLANZapine (ZyPREXA) tablet 10 mg, 10 mg, Oral, HS, Blaise Wade PA-C, 10 mg at 07/25/18 2229      Objective:     Vital Signs:  Vitals:    07/27/18 0631 07/27/18 1715 07/29/18 0741 07/29/18 0743   BP: 129/90 123/80 116/82 112/83   BP Location: Left arm Left arm Left arm Left arm   Pulse: 77 63 59 59   Resp: 18 20 16    Temp:  97 6 °F (36 4 °C) 97 6 °F (36 4 °C)    TempSrc:  Temporal Temporal    SpO2:       Weight:    68 5 kg (151 lb)   Height:             Appearance:  age appropriate and casually dressed   Behavior:  guarded and psychomotor agitation   Speech:  loud and tangential   Mood:  irritable and labile   Affect:  labile   Thought Process:  circumstantial and disorganized   Thought Content:  normal   Perceptual Disturbances: None   Risk Potential: none   Sensorium:  person, place, situation and time   Cognition:  intact   Consciousness:  alert and awake    Attention: attention span appeared shorter than expected for age   Intellect: average   Insight:  poor   Judgment: poor      Motor Activity: no abnormal movements           Recent Labs:  Results Reviewed     Procedure Component Value Units Date/Time    Urine Microscopic [[id number]]  (Abnormal) Collected:  07/19/18 1759    Lab Status:  Final result Specimen:  Urine from Urine, Clean Catch Updated:  07/19/18 1857     RBC, UA 2-4 (A) /hpf      WBC, UA 4-10 (A) /hpf      Epithelial Cells Occasional /hpf      Bacteria, UA None Seen /hpf     Rapid drug screen, urine [[id number]]  (Normal) Collected:  07/19/18 1759    Lab Status:  Final result Specimen:  Urine from Urine, Clean Catch Updated:  07/19/18 1839     Amph/Meth UR Negative     Barbiturate Ur Negative     Benzodiazepine Urine Negative     Cocaine Urine Negative     Methadone Urine Negative     Opiate Urine Negative     PCP Ur Negative     THC Urine Negative    Narrative:         FOR MEDICAL PURPOSES ONLY     IF CONFIRMATION NEEDED PLEASE CONTACT THE LAB WITHIN 5 DAYS  Drug Screen Cutoff Levels:  AMPHETAMINE/METHAMPHETAMINES  1000 ng/mL  BARBITURATES     200 ng/mL  BENZODIAZEPINES     200 ng/mL  COCAINE      300 ng/mL  METHADONE      300 ng/mL  OPIATES      300 ng/mL  PHENCYCLIDINE     25 ng/mL  THC       50 ng/mL    Ethanol [[id number]]  (Normal) Collected:  07/19/18 1757    Lab Status:  Final result Specimen:  Blood from Arm, Left Updated:  07/19/18 1826     Ethanol Lvl <10 mg/dL     Comprehensive metabolic panel [[id number]]  (Abnormal) Collected:  07/19/18 1757    Lab Status:  Final result Specimen:  Blood from Arm, Left Updated:  07/19/18 1826     Sodium 144 mmol/L      Potassium 3 5 (L) mmol/L      Chloride 106 mmol/L      CO2 28 mmol/L      Anion Gap 10 mmol/L      BUN 19 mg/dL      Creatinine 1 02 mg/dL      Glucose 117 (H) mg/dL      Calcium 9 6 mg/dL      AST 41 U/L      ALT 49 U/L      Alkaline Phosphatase 72 U/L      Total Protein 7 8 g/dL      Albumin 4 6 g/dL      Total Bilirubin 0 60 mg/dL      eGFR 86 ml/min/1 73sq m     Narrative:         National Kidney Disease Education Program recommendations are as follows:  GFR calculation is accurate only with a steady state creatinine  Chronic Kidney disease less than 60 ml/min/1 73 sq  meters  Kidney failure less than 15 ml/min/1 73 sq  meters      UA w Reflex to Microscopic [[id number]]  (Abnormal) Collected:  07/19/18 1759    Lab Status:  Final result Specimen:  Urine from Urine, Clean Catch Updated:  07/19/18 1823     Color, UA Yellow     Clarity, UA Clear     Specific Gravity, UA 1 025     pH, UA 5 0     Leukocytes, UA 25 0 (A)     Nitrite, UA Negative     Protein, UA 30 (1+) (A) mg/dl      Glucose, UA Negative mg/dl      Ketones, UA Negative mg/dl      Bilirubin, UA Negative     Blood, UA 10 0 (A)     UROBILINOGEN UA Negative mg/dL     CBC and differential [[id number]]  (Abnormal) Collected:  07/19/18 1757    Lab Status:  Final result Specimen:  Blood from Arm, Left Updated:  07/19/18 1811 WBC 7 20 Thousand/uL      RBC 4 35 (L) Million/uL      Hemoglobin 13 4 (L) g/dL      Hematocrit 39 8 (L) %      MCV 92 fL      MCH 30 9 pg      MCHC 33 8 g/dL      RDW 14 1 %      MPV 9 0 fL      Platelets 293 Thousands/uL      Neutrophils Relative 58 %      Lymphocytes Relative 32 %      Monocytes Relative 8 %      Eosinophils Relative 2 %      Basophils Relative 1 %      Neutrophils Absolute 4 20 Thousands/µL      Lymphocytes Absolute 2 30 Thousands/µL      Monocytes Absolute 0 60 Thousand/µL      Eosinophils Absolute 0 10 Thousand/µL      Basophils Absolute 0 10 Thousands/µL           I/O Past 24 hours:  No intake/output data recorded  No intake/output data recorded  Assessment / Plan:     Schizoaffective disorder, bipolar type ([facility] )    Recommended Treatment:      Medication changes:  1) Continue current medication regimen  Non-pharmacological treatments  1) Continue with group therapy, milieu therapy and occupational therapy  Safety  1) Safety/communication plan established targeting dynamic risk factors above  2) Risks, benefits, and possible side effects of medications explained to patient and patient verbalizes understanding  Counseling / Coordination of Care    Total floor / unit time spent today 20 minutes  Greater than 50% of total time was spent with the patient and / or family counseling and / or coordination of care  A description of the counseling / coordination of care  Patient's Rights, confidentiality and exceptions to confidentiality, use of automated medical record, [facility] staff access to medical record, and consent to treatment reviewed      Nathan Montoya PA-C You can access the [facility] Patient Portal, offered by [facility], by registering with the following website: [email or web address]

## 2018-07-30 RX ADMIN — HALOPERIDOL 5 MG: 5 TABLET ORAL at 17:55

## 2018-07-30 RX ADMIN — BENZTROPINE MESYLATE 0.5 MG: 0.5 TABLET ORAL at 17:55

## 2018-07-30 NOTE — PROGRESS NOTES
Client approached this nurse and stated he was ready for his medications  Asked what he had due  The writer educated the client on his current medications  He declined Norvasc at that time  Client states he "can't take most antidepressants  I took [address] and it caused me to break out in warts  That's so embarrassing " Client appear with bright affect  Is visual on the unit, but is withdrawn from peers

## 2018-07-30 NOTE — NURSING NOTE
Received patient at 1900  Patient was withdrawn to room for most of the evening but did come out for snack  Patient was not seen engaging with peers and appeared to be slightly irritable while standing in the snack line  Patient remains only partially compliant with his medication regimen and only accepted Zyprexa 10mg when brought to his room at exactly 2230  Patient was found sitting up in his room appearing to be staring at his reflection in the window  Patient carefully examined Zyprexa tablet before taking it  Patient has appeared to be sleeping  Will continue to monitor closely on assault and suicide precautions

## 2018-07-30 NOTE — PROGRESS NOTES
Pt was cooperative and participated [facility] discovery group  Pt has been writing on sign in sheet "Jaret Madsen" or "Juliano Flynn"  Last week pt referred only on sign in as "Leanna Ambriz"  Pt increased his participation and focused on [place] [id number] recovery needs and engaged in topic  Needed redirection during RICHARD group when he was focused on the relationship of José Augustine and their purpose (which was not part of the [facility] presentation and discussion)  Continue to offer therapeutic group support

## 2018-07-30 NOTE — PROGRESS NOTES
Psychiatry Progress Note    Subjective: Interval History     Patient continues to have ongoing mood lability  Patient with episodes of agitation over the weekend requiring crisis intervention  Patient also with behaviors of spitting at his peers  Patient has continued to be noncompliant with his psychiatric medications  Patient has signed to 72 hr notice papers which he is provided to author  Addis Betts has thoroughly educated patient that he is ineligible to sign a 72 hr notice due to his involuntary commitment  Patient's 302 and 303 paperwork was reviewed with the patient this morning  Patient wanting to know who is POA of the planet and had the ability to involuntarily commit him  Patient continues to lack insight into his behaviors prior to admission and his ongoing symptoms during his admission  Patient reports that he is doing well and is ready to leave and return back to his job  Patient expressing desire to appeal his involuntary status      Current medications:    Current Facility-Administered Medications:     acetaminophen (TYLENOL) tablet 650 mg, 650 mg, Oral, Q4H PRN, Rossi Fitzgerald MD    amLODIPine (NORVASC) tablet 10 mg, 10 mg, Oral, Q24H, Rossi Fitzgerald MD, 10 mg at 07/27/18 1726    benztropine (COGENTIN) tablet 0 5 mg, 0 5 mg, Oral, TID, Riya Olivier, PA-C, 0 5 mg at 07/29/18 0934    benztropine (COGENTIN) tablet 1 mg, 1 mg, Oral, Q4H PRN, Riya Olivier, PA-C, 1 mg at 07/28/18 1952    diphenhydrAMINE (BENADRYL) injection 50 mg, 50 mg, Intramuscular, Q4H PRN, Riya Olivier PA-C    haloperidol (HALDOL) tablet 10 mg, 10 mg, Oral, Q4H PRN, Riya [place], PA-C, 10 mg at 07/28/18 1953    haloperidol (HALDOL) tablet 5 mg, 5 mg, Oral, TID, Riya Olivier, PA-C, 5 mg at 07/29/18 0934    haloperidol lactate (HALDOL) injection 10 mg, 10 mg, Intramuscular, Q4H PRN, Riya Olivier, PA-C    hydrOXYzine HCL (ATARAX) tablet 100 mg, 100 mg, Oral, Q6H PRN, Riya Olivier PA-C   ibuprofen (MOTRIN) tablet 400 mg, 400 mg, Oral, Q6H PRN, Sunil Tucker, PA-C    LORazepam (ATIVAN) 2 mg/mL injection 2 mg, 2 mg, Intramuscular, Q6H PRN, Sunil Tucker, PA-C    LORazepam (ATIVAN) tablet 1 mg, 1 mg, Oral, Q4H PRN, Sunil Tucker, PA-C, 1 mg at 07/29/18 0934    melatonin tablet 9 mg, 9 mg, Oral, HS PRN, Sunil Tucker, PA-C    OLANZapine (ZyPREXA) tablet 10 mg, 10 mg, Oral, HS, Sunil Tucker, PA-C, 10 mg at 07/29/18 2228      Objective:     Vital Signs:  Vitals:    07/27/18 0631 07/27/18 1715 07/29/18 0741 07/29/18 0743   BP: 129/90 123/80 116/82 112/83   BP Location: Left arm Left arm Left arm Left arm   Pulse: 77 63 59 59   Resp: 18 20 16    Temp:  97 6 °F (36 4 °C) 97 6 °F (36 4 °C)    TempSrc:  Temporal Temporal    SpO2:       Weight:    68 5 kg (151 lb)   Height:             Appearance:  age appropriate and casually dressed   Behavior:  normal   Speech:  normal volume   Mood:  labile   Affect:  increased in intensity   Thought Process:  disorganized and flight of ideas   Thought Content:  delusions  persecutory   Perceptual Disturbances: None   Risk Potential: none   Sensorium:  person, place and time   Cognition:  intact   Consciousness:  alert and awake    Attention: poor   Intellect: average   Insight:  poor   Judgment: poor      Motor Activity: no abnormal movements           Recent Labs:  Results Reviewed     Procedure Component Value Units Date/Time    Urine Microscopic [[id number]]  (Abnormal) Collected:  07/19/18 1759    Lab Status:  Final result Specimen:  Urine from Urine, Clean Catch Updated:  07/19/18 1857     RBC, UA 2-4 (A) /hpf      WBC, UA 4-10 (A) /hpf      Epithelial Cells Occasional /hpf      Bacteria, UA None Seen /hpf     Rapid drug screen, urine [[id number]]  (Normal) Collected:  07/19/18 1759    Lab Status:  Final result Specimen:  Urine from Urine, Clean Catch Updated:  07/19/18 1839     Amph/Meth UR Negative     Barbiturate Ur Negative     Benzodiazepine Urine Negative     Cocaine Urine Negative     Methadone Urine Negative     Opiate Urine Negative     PCP Ur Negative     THC Urine Negative    Narrative:         FOR MEDICAL PURPOSES ONLY  IF CONFIRMATION NEEDED PLEASE CONTACT THE LAB WITHIN 5 DAYS  Drug Screen Cutoff Levels:  AMPHETAMINE/METHAMPHETAMINES  1000 ng/mL  BARBITURATES     200 ng/mL  BENZODIAZEPINES     200 ng/mL  COCAINE      300 ng/mL  METHADONE      300 ng/mL  OPIATES      300 ng/mL  PHENCYCLIDINE     25 ng/mL  THC       50 ng/mL    Ethanol [[id number]]  (Normal) Collected:  07/19/18 1757    Lab Status:  Final result Specimen:  Blood from Arm, Left Updated:  07/19/18 1826     Ethanol Lvl <10 mg/dL     Comprehensive metabolic panel [[id number]]  (Abnormal) Collected:  07/19/18 1757    Lab Status:  Final result Specimen:  Blood from Arm, Left Updated:  07/19/18 1826     Sodium 144 mmol/L      Potassium 3 5 (L) mmol/L      Chloride 106 mmol/L      CO2 28 mmol/L      Anion Gap 10 mmol/L      BUN 19 mg/dL      Creatinine 1 02 mg/dL      Glucose 117 (H) mg/dL      Calcium 9 6 mg/dL      AST 41 U/L      ALT 49 U/L      Alkaline Phosphatase 72 U/L      Total Protein 7 8 g/dL      Albumin 4 6 g/dL      Total Bilirubin 0 60 mg/dL      eGFR 86 ml/min/1 73sq m     Narrative:         National Kidney Disease Education Program recommendations are as follows:  GFR calculation is accurate only with a steady state creatinine  Chronic Kidney disease less than 60 ml/min/1 73 sq  meters  Kidney failure less than 15 ml/min/1 73 sq  meters      UA w Reflex to Microscopic [[id number]]  (Abnormal) Collected:  07/19/18 1759    Lab Status:  Final result Specimen:  Urine from Urine, Clean Catch Updated:  07/19/18 1823     Color, UA Yellow     Clarity, UA Clear     Specific Gravity, UA 1 025     pH, UA 5 0     Leukocytes, UA 25 0 (A)     Nitrite, UA Negative     Protein, UA 30 (1+) (A) mg/dl      Glucose, UA Negative mg/dl      Ketones, UA Negative mg/dl      Bilirubin, UA Negative Blood, UA 10 0 (A)     UROBILINOGEN UA Negative mg/dL     CBC and differential [[id number]]  (Abnormal) Collected:  07/19/18 1757    Lab Status:  Final result Specimen:  Blood from Arm, Left Updated:  07/19/18 1811     WBC 7 20 Thousand/uL      RBC 4 35 (L) Million/uL      Hemoglobin 13 4 (L) g/dL      Hematocrit 39 8 (L) %      MCV 92 fL      MCH 30 9 pg      MCHC 33 8 g/dL      RDW 14 1 %      MPV 9 0 fL      Platelets 068 Thousands/uL      Neutrophils Relative 58 %      Lymphocytes Relative 32 %      Monocytes Relative 8 %      Eosinophils Relative 2 %      Basophils Relative 1 %      Neutrophils Absolute 4 20 Thousands/µL      Lymphocytes Absolute 2 30 Thousands/µL      Monocytes Absolute 0 60 Thousand/µL      Eosinophils Absolute 0 10 Thousand/µL      Basophils Absolute 0 10 Thousands/µL           I/O Past 24 hours:  No intake/output data recorded  No intake/output data recorded  Assessment / Plan:     Schizoaffective disorder, bipolar type ([facility] )    Recommended Treatment:      Medication changes:  Patient continues to be educated by staff on the importance of complying with his p o  psychotropic medication regimen  Patient is also continued to refuse his blood pressure medication  Due to ongoing symptoms and noncompliance with his medication evaluation for administration of intramuscular medications over PO refusal will be evaluated for  Non-pharmacological treatments  1) Continue with group therapy, milieu therapy and occupational therapy  Safety  1) Safety/communication plan established targeting dynamic risk factors above  2) Risks, benefits, and possible side effects of medications explained to patient and patient verbalizes understanding  Counseling / Coordination of Care    Total floor / unit time spent today 20 minutes  Greater than 50% of total time was spent with the patient and / or family counseling and / or coordination of care   A description of the counseling / coordination of care  Patient's Rights, confidentiality and exceptions to confidentiality, use of automated medical record, [facility] staff access to medical record, and consent to treatment reviewed      Heather Gonzalez PA-C

## 2018-07-30 NOTE — PROGRESS NOTES
Client approach this nurse at the med door and requested to know what medications he is "due for " Educated the client on his current medication orders  He then refused his medications and stated, "I want to be sharp today, so I'm not taking them  Client appears pleasant upon approach  Denies HI, SI, AH, VH, depression and anxiety  He is visible on the unit, but remains withdrawn from socialization  He will continue to be monitored

## 2018-07-30 NOTE — NURSING NOTE
Patient appeared to sleep through the night without interruption  Will continue to monitor closely on assault and suicide precautions

## 2018-07-31 RX ORDER — OLANZAPINE 10 MG/1
20 TABLET ORAL
Status: DISCONTINUED | OUTPATIENT
Start: 2018-07-31 | End: 2018-08-16 | Stop reason: HOSPADM

## 2018-07-31 RX ORDER — HALOPERIDOL 5 MG
10 TABLET ORAL 3 TIMES DAILY
Status: DISCONTINUED | OUTPATIENT
Start: 2018-07-31 | End: 2018-08-16 | Stop reason: HOSPADM

## 2018-07-31 RX ADMIN — BENZTROPINE MESYLATE 0.5 MG: 0.5 TABLET ORAL at 08:21

## 2018-07-31 RX ADMIN — HALOPERIDOL 10 MG: 5 TABLET ORAL at 15:23

## 2018-07-31 RX ADMIN — BENZTROPINE MESYLATE 0.5 MG: 0.5 TABLET ORAL at 15:26

## 2018-07-31 RX ADMIN — HALOPERIDOL 5 MG: 5 TABLET ORAL at 08:15

## 2018-07-31 NOTE — PROGRESS NOTES
Patient remains visible on the unit  He is not observed interacting with his peers  He does interact freely with staff  He was meal compliant, no meds this portion of the shift  He has been pleasant and cooperative  Staff will continue to monitor

## 2018-07-31 NOTE — PROGRESS NOTES
Psychiatry Progress Note    Subjective: Interval History     Patient is continue remain noncompliant with his treatment  Patient yesterday refusing his HS Zyprexa dosing which he usually will was compliant with  Patient this morning reports that he does not need medications as he is doing well  Patient asking to be transferred to another hospital   Patient expressing paranoia as he is reporting that a person by the name of Brittani Gómez was making threats to the president  Patient reports that he then contacted the FBI to inform them of such threats  Patient believes that this person is having people infiltrate into the psychiatric unit  Patient continues to lack insight into his paranoia  Patient's mood continues to remain labile during the day  Still irritable when staff attempts to encourage him to be compliant with his ordered medication regimen      Current medications:    Current Facility-Administered Medications:     acetaminophen (TYLENOL) tablet 650 mg, 650 mg, Oral, Q4H PRN, Curtis Thomas MD    amLODIPine (NORVASC) tablet 10 mg, 10 mg, Oral, Q24H, Curtis Thomas MD, 10 mg at 07/27/18 1726    benztropine (COGENTIN) tablet 0 5 mg, 0 5 mg, Oral, TID, [facility], PA-C, 0 5 mg at 07/31/18 2815    benztropine (COGENTIN) tablet 1 mg, 1 mg, Oral, Q4H PRN, [facility], PA-C, 1 mg at 07/28/18 1952    diphenhydrAMINE (BENADRYL) injection 50 mg, 50 mg, Intramuscular, Q4H PRN, [place] K[place]berly, PA-C    haloperidol (HALDOL) tablet 10 mg, 10 mg, Oral, Q4H PRN, [facility], PA-C, 10 mg at 07/28/18 1953    haloperidol (HALDOL) tablet 10 mg, 10 mg, Oral, TID, [place] Kymberly, PA-C    haloperidol lactate (HALDOL) injection 10 mg, 10 mg, Intramuscular, Q4H PRN, [facility], PA-C    hydrOXYzine HCL (ATARAX) tablet 100 mg, 100 mg, Oral, Q6H PRN, [facility], PA-C    ibuprofen (MOTRIN) tablet 400 mg, 400 mg, Oral, Q6H PRN, [facility], PA-C    LORazepam (ATIVAN) 2 mg/mL injection 2 mg, 2 mg, Intramuscular, Q6H PRN, DAVID Osorio-C    LORazepam (ATIVAN) tablet 1 mg, 1 mg, Oral, Q4H PRN, DAVID Osorio-C, 1 mg at 07/29/18 0934    melatonin tablet 9 mg, 9 mg, Oral, HS PRN, Felipe Lawrence PA-C    OLANZapine (ZyPREXA) tablet 20 mg, 20 mg, Oral, HS, DAVID Osorio-C      Objective:     Vital Signs:  Vitals:    07/29/18 0741 07/29/18 0743 07/31/18 0606 07/31/18 0608   BP: 116/82 112/83 126/78 120/81   BP Location: Left arm Left arm Left arm Left arm   Pulse: 59 59 56 55   Resp: 16 18 18   Temp: 97 6 °F (36 4 °C)  (!) 96 4 °F (35 8 °C)    TempSrc: Temporal  Temporal    SpO2:   98%    Weight:  68 5 kg (151 lb)     Height:             Appearance:  age appropriate and casually dressed   Behavior:  normal   Speech:  normal volume   Mood:  labile   Affect:  increased in intensity   Thought Process:  disorganized and flight of ideas   Thought Content:  delusions  persecutory   Perceptual Disturbances: None   Risk Potential: none   Sensorium:  person, place and time   Cognition:  intact   Consciousness:  alert and awake    Attention: poor   Intellect: average   Insight:  poor   Judgment: poor      Motor Activity: no abnormal movements           Recent Labs:  Results Reviewed     Procedure Component Value Units Date/Time    Urine Microscopic [[id number]]  (Abnormal) Collected:  07/19/18 1759    Lab Status:  Final result Specimen:  Urine from Urine, Clean Catch Updated:  07/19/18 1857     RBC, UA 2-4 (A) /hpf      WBC, UA 4-10 (A) /hpf      Epithelial Cells Occasional /hpf      Bacteria, UA None Seen /hpf     Rapid drug screen, urine [[id number]]  (Normal) Collected:  07/19/18 1759    Lab Status:  Final result Specimen:  Urine from Urine, Clean Catch Updated:  07/19/18 1839     Amph/Meth UR Negative     Barbiturate Ur Negative     Benzodiazepine Urine Negative     Cocaine Urine Negative     Methadone Urine Negative     Opiate Urine Negative     PCP Ur Negative     THC Urine Negative    Narrative:         FOR MEDICAL PURPOSES ONLY  IF CONFIRMATION NEEDED PLEASE CONTACT THE LAB WITHIN 5 DAYS  Drug Screen Cutoff Levels:  AMPHETAMINE/METHAMPHETAMINES  1000 ng/mL  BARBITURATES     200 ng/mL  BENZODIAZEPINES     200 ng/mL  COCAINE      300 ng/mL  METHADONE      300 ng/mL  OPIATES      300 ng/mL  PHENCYCLIDINE     25 ng/mL  THC       50 ng/mL    Ethanol [[id number]]  (Normal) Collected:  07/19/18 1757    Lab Status:  Final result Specimen:  Blood from Arm, Left Updated:  07/19/18 1826     Ethanol Lvl <10 mg/dL     Comprehensive metabolic panel [[id number]]  (Abnormal) Collected:  07/19/18 1757    Lab Status:  Final result Specimen:  Blood from Arm, Left Updated:  07/19/18 1826     Sodium 144 mmol/L      Potassium 3 5 (L) mmol/L      Chloride 106 mmol/L      CO2 28 mmol/L      Anion Gap 10 mmol/L      BUN 19 mg/dL      Creatinine 1 02 mg/dL      Glucose 117 (H) mg/dL      Calcium 9 6 mg/dL      AST 41 U/L      ALT 49 U/L      Alkaline Phosphatase 72 U/L      Total Protein 7 8 g/dL      Albumin 4 6 g/dL      Total Bilirubin 0 60 mg/dL      eGFR 86 ml/min/1 73sq m     Narrative:         National Kidney Disease Education Program recommendations are as follows:  GFR calculation is accurate only with a steady state creatinine  Chronic Kidney disease less than 60 ml/min/1 73 sq  meters  Kidney failure less than 15 ml/min/1 73 sq  meters      UA w Reflex to Microscopic [[id number]]  (Abnormal) Collected:  07/19/18 1759    Lab Status:  Final result Specimen:  Urine from Urine, Clean Catch Updated:  07/19/18 1823     Color, UA Yellow     Clarity, UA Clear     Specific Gravity, UA 1 025     pH, UA 5 0     Leukocytes, UA 25 0 (A)     Nitrite, UA Negative     Protein, UA 30 (1+) (A) mg/dl      Glucose, UA Negative mg/dl      Ketones, UA Negative mg/dl      Bilirubin, UA Negative     Blood, UA 10 0 (A)     UROBILINOGEN UA Negative mg/dL     CBC and differential [[id number]]  (Abnormal) Collected: 07/19/18 1757    Lab Status:  Final result Specimen:  Blood from Arm, Left Updated:  07/19/18 1811     WBC 7 20 Thousand/uL      RBC 4 35 (L) Million/uL      Hemoglobin 13 4 (L) g/dL      Hematocrit 39 8 (L) %      MCV 92 fL      MCH 30 9 pg      MCHC 33 8 g/dL      RDW 14 1 %      MPV 9 0 fL      Platelets 659 Thousands/uL      Neutrophils Relative 58 %      Lymphocytes Relative 32 %      Monocytes Relative 8 %      Eosinophils Relative 2 %      Basophils Relative 1 %      Neutrophils Absolute 4 20 Thousands/µL      Lymphocytes Absolute 2 30 Thousands/µL      Monocytes Absolute 0 60 Thousand/µL      Eosinophils Absolute 0 10 Thousand/µL      Basophils Absolute 0 10 Thousands/µL           I/O Past 24 hours:  No intake/output data recorded  No intake/output data recorded  Assessment / Plan:     [facility]ffective [facility]    Recommended Treatment:      Medication changes:  Increase Haldol 10 mg t i d , Zyprexa 20 mg at bedtime  Will encourage patient to comply with his medications however evaluation for administration of intramuscular medications well off to be evaluated for  Non-pharmacological treatments  1) Continue with group therapy, milieu therapy and occupational therapy  Safety  1) Safety/communication plan established targeting dynamic risk factors above  2) Risks, benefits, and possible side effects of medications explained to patient and patient verbalizes understanding  Counseling / Coordination of Care    Total floor / unit time spent today 20 minutes  Greater than 50% of total time was spent with the patient and / or family counseling and / or coordination of care  A description of the counseling / coordination of care  Patient's Rights, confidentiality and exceptions to confidentiality, use of automated medical record, [facility] staff access to medical record, and consent to treatment reviewed      Felipe Lawrence PA-C

## 2018-07-31 NOTE — PROGRESS NOTES
Pt was pleasant and cooperative  Pt has improved greatly since his admission  Pt has denied any depression, SI, HI, AH, or VH  Pt felt a little anxious due to having to shower in another room due to maintenance working on his bathroom  Will continue to monitor

## 2018-07-31 NOTE — SOCIAL WORK
Patient requested to speak with worker regarding a discharge date  Worker informed him that a discharge date has not been determined yet  Patient then requesting to get transferred to [address] [facility]  Worker informed him that they do not have an inpatient psychiatric unit since the early 90's  Worker then asked if he would like outpatient psychiatric services through them, he reports he has an appointment at their medical clinic but did express interest in their outpatient psychiatric services  Worker reported that when a discharge date has been determined, worker will contact them to arrange an intake appointment if they are accepting new clients  Patient verbalized understanding  Patient then reporting that the honorable  Jcarlos Fitzgerald had encouraged him to work for the [employer] authority but he is unsure if he can at this moment time  Patient then reporting he would like a discharge date in the next 7-10 days as he is awaiting to hear from the [employer] as to whether or not the group he was organizing to go to the [address] [place] [address] would be approved  Patient then reporting that he initially thought having Bronwyn Anguiano would not be beneficial to him in paying for his rent; but he reported that if he goes to the [address] [place] [address] he will still have his apartment  Worker asked if Ambrose Justin was a friend or worker through an agency, he informed worker that she works through [employer] as his rep-payee  Worker informed him that once a discharge date has been determined he will be notified  Worker will continue to follow-up

## 2018-07-31 NOTE — PROGRESS NOTES
Pt was cooperative in crisis management group  Pt was able to listen to topic of understanding mental health relapse (identifying triggers and strategies to reduce relapse)  Pt actions at times draws attention to himself and distractible  Pt bright affect and expressed interest in the subject matter  However, pt lacks insight and depth  Speaking on surface about smoking needs and not focused on MH recovery aspects

## 2018-07-31 NOTE — PROGRESS NOTES
07/31/18 1345   Activity/Group Checklist   Group Other (Comment)  (Art Therapy Process Group / [facility] [employer])   Attendance Attended   Attendance Duration (min) Greater than 60   Interactions Interacted appropriately   Affect/Mood Appropriate; Constricted   Goals Achieved Able to listen to others; Able to engage in interactions; Able to recieve feedback  (Able to engage art materials)     Noted improvement in affect; less agitated  However, continues to comment out of context

## 2018-07-31 NOTE — PLAN OF CARE
PSYCHOSIS     Will report no hallucinations or delusions Not Progressing          ANXIETY     Will report anxiety at manageable levels Progressing        DISCHARGE PLANNING     Discharge to home or other facility with appropriate resources Progressing        SELF CARE DEFICIT     Return ADL status to a safe level of function Progressing

## 2018-08-01 RX ORDER — DOCUSATE SODIUM 100 MG/1
100 CAPSULE, LIQUID FILLED ORAL EVERY 6 HOURS PRN
Status: DISCONTINUED | OUTPATIENT
Start: 2018-08-01 | End: 2018-08-16 | Stop reason: HOSPADM

## 2018-08-01 RX ORDER — POLYETHYLENE GLYCOL 3350 17 G/17G
17 POWDER, FOR SOLUTION ORAL 2 TIMES DAILY PRN
Status: DISCONTINUED | OUTPATIENT
Start: 2018-08-01 | End: 2018-08-16 | Stop reason: HOSPADM

## 2018-08-01 RX ADMIN — HALOPERIDOL 10 MG: 5 TABLET ORAL at 21:27

## 2018-08-01 RX ADMIN — HALOPERIDOL 10 MG: 5 TABLET ORAL at 11:11

## 2018-08-01 RX ADMIN — BENZTROPINE MESYLATE 0.5 MG: 0.5 TABLET ORAL at 21:27

## 2018-08-01 RX ADMIN — AMLODIPINE BESYLATE 10 MG: 5 TABLET ORAL at 17:03

## 2018-08-01 RX ADMIN — OLANZAPINE 20 MG: 10 TABLET, FILM COATED ORAL at 21:28

## 2018-08-01 RX ADMIN — BENZTROPINE MESYLATE 0.5 MG: 0.5 TABLET ORAL at 17:04

## 2018-08-01 RX ADMIN — BENZTROPINE MESYLATE 0.5 MG: 0.5 TABLET ORAL at 11:11

## 2018-08-01 RX ADMIN — HALOPERIDOL 10 MG: 5 TABLET ORAL at 17:04

## 2018-08-01 NOTE — PROGRESS NOTES
Psychiatry Progress Note    Subjective: Interval History     Patient yesterday refused to take HS Zyprexa dosing  Patient this morning with an irritable edge  Patient demanding a discharge date  Patient advised that a discharge date has not been determined at this time  Patient reporting that he doing fine and is ready to go  Patient reporting that he does not need any medications  Patient lacking insight into his symptoms and the need for medication management  Patient continues to have ongoing flight of ideas during assessment as well as delusional thought content  Patient yesterday stating to his  that he was waiting to hear back from the [employer]  Patient was reporting that he had been organizing a group to go to the [facility]      Current medications:    Current Facility-Administered Medications:     acetaminophen (TYLENOL) tablet 650 mg, 650 mg, Oral, Q4H PRN, Fay Millan MD    amLODIPine (NORVASC) tablet 10 mg, 10 mg, Oral, Q24H, Fay Millan MD, 10 mg at 07/27/18 1726    benztropine (COGENTIN) tablet 0 5 mg, 0 5 mg, Oral, TID, Erica Apley, PA-C, 0 5 mg at 07/31/18 1526    benztropine (COGENTIN) tablet 1 mg, 1 mg, Oral, Q4H PRN, Erica Apley, PA-C, 1 mg at 07/28/18 1952    diphenhydrAMINE (BENADRYL) injection 50 mg, 50 mg, Intramuscular, Q4H PRN, Ericaysa Apley, PA-C    haloperidol (HALDOL) tablet 10 mg, 10 mg, Oral, Q4H PRN, Erica Apley, PA-C, 10 mg at 07/28/18 1953    haloperidol (HALDOL) tablet 10 mg, 10 mg, Oral, TID, Erica Apley, PA-C, 10 mg at 07/31/18 1523    haloperidol lactate (HALDOL) injection 10 mg, 10 mg, Intramuscular, Q4H PRN, Erica Apley, PA-C    hydrOXYzine HCL (ATARAX) tablet 100 mg, 100 mg, Oral, Q6H PRN, Erica Aplben, PA-C    ibuprofen (MOTRIN) tablet 400 mg, 400 mg, Oral, Q6H PRN, Erica Apley, PA-C    LORazepam (ATIVAN) 2 mg/mL injection 2 mg, 2 mg, Intramuscular, Q6H PRN, Neysa Apley, PA-C    LORazepam (ATIVAN) tablet 1 mg, 1 mg, Oral, Q4H PRN, Sera James PA-C, 1 mg at 07/29/18 0934    melatonin tablet 9 mg, 9 mg, Oral, HS PRN, Sera James PA-C    OLANZapine (ZyPREXA) tablet 20 mg, 20 mg, Oral, HS, Sera James PA-C      Objective:     Vital Signs:  Vitals:    07/31/18 0608 07/31/18 1530 08/01/18 0715 08/01/18 0718   BP: 120/81 125/81 116/71 103/73   BP Location: Left arm  Left arm Left arm   Pulse: 55  70 81   Resp: 18 18 18   Temp:   97 8 °F (36 6 °C)    TempSrc:   Temporal    SpO2:       Weight:       Height:             Appearance:  age appropriate and casually dressed   Behavior:  normal   Speech:  normal volume   Mood:  labile   Affect:  increased in intensity   Thought Process:  disorganized and flight of ideas   Thought Content:  delusions  grandiose and persecutory   Perceptual Disturbances: None   Risk Potential: none   Sensorium:  person, place and time   Cognition:  intact   Consciousness:  alert and awake    Attention: poor   Intellect: average   Insight:  poor   Judgment: poor      Motor Activity: no abnormal movements           Recent Labs:  Results Reviewed     Procedure Component Value Units Date/Time    Urine Microscopic [[id number]]  (Abnormal) Collected:  07/19/18 1759    Lab Status:  Final result Specimen:  Urine from Urine, Clean Catch Updated:  07/19/18 1857     RBC, UA 2-4 (A) /hpf      WBC, UA 4-10 (A) /hpf      Epithelial Cells Occasional /hpf      Bacteria, UA None Seen /hpf     Rapid drug screen, urine [[id number]]  (Normal) Collected:  07/19/18 1759    Lab Status:  Final result Specimen:  Urine from Urine, Clean Catch Updated:  07/19/18 1839     Amph/Meth UR Negative     Barbiturate Ur Negative     Benzodiazepine Urine Negative     Cocaine Urine Negative     Methadone Urine Negative     Opiate Urine Negative     PCP Ur Negative     THC Urine Negative    Narrative:         FOR MEDICAL PURPOSES ONLY     IF CONFIRMATION NEEDED PLEASE CONTACT THE LAB WITHIN 5 DAYS     Drug Screen Cutoff Levels:  AMPHETAMINE/METHAMPHETAMINES  1000 ng/mL  BARBITURATES     200 ng/mL  BENZODIAZEPINES     200 ng/mL  COCAINE      300 ng/mL  METHADONE      300 ng/mL  OPIATES      300 ng/mL  PHENCYCLIDINE     25 ng/mL  THC       50 ng/mL    Ethanol [[id number]]  (Normal) Collected:  07/19/18 1757    Lab Status:  Final result Specimen:  Blood from Arm, Left Updated:  07/19/18 1826     Ethanol Lvl <10 mg/dL     Comprehensive metabolic panel [[id number]]  (Abnormal) Collected:  07/19/18 1757    Lab Status:  Final result Specimen:  Blood from Arm, Left Updated:  07/19/18 1826     Sodium 144 mmol/L      Potassium 3 5 (L) mmol/L      Chloride 106 mmol/L      CO2 28 mmol/L      Anion Gap 10 mmol/L      BUN 19 mg/dL      Creatinine 1 02 mg/dL      Glucose 117 (H) mg/dL      Calcium 9 6 mg/dL      AST 41 U/L      ALT 49 U/L      Alkaline Phosphatase 72 U/L      Total Protein 7 8 g/dL      Albumin 4 6 g/dL      Total Bilirubin 0 60 mg/dL      eGFR 86 ml/min/1 73sq m     Narrative:         National Kidney Disease Education Program recommendations are as follows:  GFR calculation is accurate only with a steady state creatinine  Chronic Kidney disease less than 60 ml/min/1 73 sq  meters  Kidney failure less than 15 ml/min/1 73 sq  meters      UA w Reflex to Microscopic [[id number]]  (Abnormal) Collected:  07/19/18 1759    Lab Status:  Final result Specimen:  Urine from Urine, Clean Catch Updated:  07/19/18 1823     Color, UA Yellow     Clarity, UA Clear     Specific Gravity, UA 1 025     pH, UA 5 0     Leukocytes, UA 25 0 (A)     Nitrite, UA Negative     Protein, UA 30 (1+) (A) mg/dl      Glucose, UA Negative mg/dl      Ketones, UA Negative mg/dl      Bilirubin, UA Negative     Blood, UA 10 0 (A)     UROBILINOGEN UA Negative mg/dL     CBC and differential [[id number]]  (Abnormal) Collected:  07/19/18 1757    Lab Status:  Final result Specimen:  Blood from Arm, Left Updated:  07/19/18 1811     WBC 7 20 Thousand/uL RBC 4 35 (L) Million/uL      Hemoglobin 13 4 (L) g/dL      Hematocrit 39 8 (L) %      MCV 92 fL      MCH 30 9 pg      MCHC 33 8 g/dL      RDW 14 1 %      MPV 9 0 fL      Platelets 559 Thousands/uL      Neutrophils Relative 58 %      Lymphocytes Relative 32 %      Monocytes Relative 8 %      Eosinophils Relative 2 %      Basophils Relative 1 %      Neutrophils Absolute 4 20 Thousands/µL      Lymphocytes Absolute 2 30 Thousands/µL      Monocytes Absolute 0 60 Thousand/µL      Eosinophils Absolute 0 10 Thousand/µL      Basophils Absolute 0 10 Thousands/µL           I/O Past 24 hours:  No intake/output data recorded  No intake/output data recorded  Assessment / Plan:     Schizoaffective disorder, bipolar type ([facility] )    Recommended Treatment:      Medication changes:  Continue to encourage p o  compliance of increase Haldol and Zyprexa dosing  Non-pharmacological treatments  1) Continue with group therapy, milieu therapy and occupational therapy  Safety  1) Safety/communication plan established targeting dynamic risk factors above  2) Risks, benefits, and possible side effects of medications explained to patient and patient verbalizes understanding  Counseling / Coordination of Care    Total floor / unit time spent today 20 minutes  Greater than 50% of total time was spent with the patient and / or family counseling and / or coordination of care  A description of the counseling / coordination of care  Patient's Rights, confidentiality and exceptions to confidentiality, use of automated medical record, [facility] staff access to medical record, and consent to treatment reviewed      Riya Olivier PA-C

## 2018-08-01 NOTE — NURSING NOTE
Asha Carl is pleasant and cooperative  He took his meds as ordered  When asked, he denies A/V hallucinations and thoughts of self harm   Tends to keep to himself and quiet

## 2018-08-01 NOTE — PROGRESS NOTES
Pt more pleasant today  Pt did not want to take his medication around 0900 and was in bed stating that he is sick and did not want to take his medications  At approximately 1100, pt comes to med room requests his medications  He was given his medications at this time  Pt denies any anxiety, depression, SI, HI, AH, or VH  Pt states "I just feel under the weather"  He did not take a shower this morning but plans to take one  Will continue to monitor

## 2018-08-01 NOTE — NURSING NOTE
Received patient at 1900  Patient was cooperative and withdrawn to room during the evening  Patient did present for evening snack  Patient pleasant at times but easily becomes irritated  Patient refused all HS medications because he "already took too many medications today " Patient has appeared to be sleeping  Will continue to monitor closely on assault and suicide precautions

## 2018-08-01 NOTE — PLAN OF CARE
Problem: Alteration in Thoughts and Perception  Goal: Verbalize thoughts and feelings  Interventions:  - Promote a nonjudgmental and trusting relationship with the patient through active listening and therapeutic communication  - Assess patient's level of functioning, behavior and potential for risk  - Engage patient in 1 on 1 interactions for a minimum of 15 minutes each session  - Encourage patient to express fears, feelings, frustrations, and discuss symptoms    - [place] patient to reality, help patient recognize reality-based thinking   - Administer medications as ordered and assess for potential side effects  - Provide the patient education related to the signs and symptoms of the illness and desired effects of prescribed medications   Outcome: Progressing      Problem: Ineffective Coping  Goal: Identifies ineffective coping skills  Outcome: Progressing    Goal: Participates in unit activities  Interventions:  - Provide therapeutic environment   - Provide required programming   - Redirect inappropriate behaviors    Outcome: Progressing      Problem: Risk for Self Injury/Neglect  Goal: Verbalize thoughts and feelings  Interventions:  - Assess and re-assess patient's lethality and potential for self-injury  - Engage patient in 1:1 interactions, daily, for a minimum of 15 minutes  - Encourage patient to express feelings, fears, frustrations, hopes  - Establish rapport/trust with patient    Outcome: Progressing      Problem: Depression  Goal: Verbalize thoughts and feelings  Interventions:  - Assess and re-assess patient's level of risk   - Engage patient in 1:1 interactions, daily, for a minimum of 15 minutes   - Encourage patient to express feelings, fears, frustrations, hopes    Outcome: Progressing      Problem: Risk for Violence/Aggression Toward Others  Goal: Verbalize thoughts and feelings  Interventions:  - Assess and re-assess patient's level of risk, every waking shift  - Engage patient in 1:1 interactions, daily, for a minimum of 15 minutes   - Allow patient to express feelings and frustrations in a safe and non-threatening manner   - Establish rapport/trust with patient    Outcome: Progressing      Problem: Alteration in Orientation  Goal: Attend and participate in unit activities, including therapeutic, recreational, and educational groups  Interventions:  - Provide therapeutic and educational activities daily, encourage attendance and participation, and document same in the medical record   - Provide appropriate opportunities for reminiscence   - Provide a consistent daily routine   - Encourage family contact/visitation    Outcome: Progressing      Comments: We discussed his issues and [facility] has problems with coping issues  This time he has problems with his landlord and wanting to get his locks changed  Said he found out from a neighbor that people have a key to his place  He has no idea how to get in touch with the landlord and this is stressing him out  We logically laid out a plan how his rep-payee to get in touch with his landlord  Pleasant and cooperative

## 2018-08-01 NOTE — PROGRESS NOTES
08/01/18 7895   Activity/Group Checklist   Group Other (Comment)  (Art Therapy Process Group / Identifying Barriers)   Attendance Attended   Attendance Duration (min) Greater than 60   Interactions Interacted appropriately   Affect/Mood Appropriate;Blunted/flat   Goals Achieved Able to listen to others; Able to engage in interactions  (Able to engage art materials)     Patient veered from directive and continues to avoid self  When confronted, he redirects conversation  Patient remains delusional and unorganized  Artwork forms are coherent, though disconnected from self; sexual undertones

## 2018-08-02 RX ORDER — LOPERAMIDE HYDROCHLORIDE 2 MG/1
2 CAPSULE ORAL EVERY 4 HOURS PRN
Status: DISCONTINUED | OUTPATIENT
Start: 2018-08-02 | End: 2018-08-16 | Stop reason: HOSPADM

## 2018-08-02 RX ADMIN — HALOPERIDOL 10 MG: 5 TABLET ORAL at 16:03

## 2018-08-02 RX ADMIN — HALOPERIDOL 10 MG: 5 TABLET ORAL at 20:38

## 2018-08-02 RX ADMIN — OLANZAPINE 20 MG: 10 TABLET, FILM COATED ORAL at 21:24

## 2018-08-02 RX ADMIN — LOPERAMIDE HYDROCHLORIDE 2 MG: 2 CAPSULE ORAL at 16:13

## 2018-08-02 RX ADMIN — BENZTROPINE MESYLATE 0.5 MG: 0.5 TABLET ORAL at 20:38

## 2018-08-02 RX ADMIN — HALOPERIDOL 10 MG: 5 TABLET ORAL at 08:31

## 2018-08-02 RX ADMIN — BENZTROPINE MESYLATE 0.5 MG: 0.5 TABLET ORAL at 16:03

## 2018-08-02 RX ADMIN — LOPERAMIDE HYDROCHLORIDE 2 MG: 2 CAPSULE ORAL at 08:31

## 2018-08-02 RX ADMIN — BENZTROPINE MESYLATE 0.5 MG: 0.5 TABLET ORAL at 08:31

## 2018-08-02 NOTE — NURSING NOTE
Patient in bed at this time and appears to be sleeping  Eyes closed and chest movements noted  Patient resting quietly at this time  No issues related to behaviors and no issues related to sleep during the night  Patient did not awaken during the night to request any PRN medications  Slept well throughout the night  Will continue to monitor

## 2018-08-02 NOTE — PLAN OF CARE
Alteration in Orientation     Attend and participate in unit activities, including therapeutic, recreational, and educational groups Progressing        Alteration in Thoughts and Perception     Verbalize thoughts and feelings Progressing        Depression     Verbalize thoughts and feelings Progressing        Ineffective Coping     Identifies ineffective coping skills Progressing     Participates in unit activities Progressing        Risk for Self Injury/Neglect     Verbalize thoughts and feelings Progressing        Risk for Violence/Aggression Toward Others     Verbalize thoughts and feelings Progressing        Patient is progressing in individual goals to meet each problem

## 2018-08-02 NOTE — NURSING NOTE
Asha Carl remains pleasant and cooperative  He was able to remain on the same topic as we talked,but several times all he would talk about is getting his locks changed on his apartment,since he feels that people have keys,his neighbor told him

## 2018-08-02 NOTE — PLAN OF CARE
Problem: DISCHARGE PLANNING  Goal: Discharge to home or other facility with appropriate resources  INTERVENTIONS:  - Identify barriers to discharge w/patient and caregiver  - Arrange for needed discharge resources and transportation as appropriate  - Identify discharge learning needs (meds, wound care, etc )  - Arrange for interpretive services to assist at discharge as needed  -Patient will return home  - will arrange outpatient psychiatric services  - Refer to Case Management Department for coordinating discharge planning if the patient needs post-hospital services based on physician/advanced practitioner order or complex needs related to functional status, cognitive ability, or social support system    Outcome: Not Progressing  Patient still delusional and grandiose  A discharge date has yet to be determined at this time

## 2018-08-02 NOTE — PROGRESS NOTES
Patient has not been visible this shift  He presented himself for morning medications  He is med/meal compliant  He does not interact or engage with his peers  He continues with delusional thought content, noting he is waiting for his assignment to the [facility]  Staff will continue to monitor for safety and well being

## 2018-08-02 NOTE — NURSING NOTE
Patient in bed at this time and appears to be sleeping  Eyes closed and chest movements noted  Patient did not report any issues related to sleep  No negative behaviors noted either  Patient did not awaken during the night to request any PRN medications  Slept well all night

## 2018-08-02 NOTE — PROGRESS NOTES
Patient was visible and walking the hallway with another peer for a short period of time after his evening meal  He was med/meal compliant  Seeking the nurse out for his scheduled meds  His behaviors have been appropriate this afternoon  Staff will continue to monitor

## 2018-08-02 NOTE — PROGRESS NOTES
Psychiatry Progress Note    Subjective: Interval History     Patient yesterday was compliant with his Haldol dosing throughout the day  Patient reports that he did have a stomach ache and reports intermittent diarrhea this morning  Patient was initially, cooperative during the assessment  During assessment attending psychiatrist did make a recommendation about consideration of a long-acting injectable due to the patient's history of noncompliance  Patient became irritated and abruptly left the office  Patient came back and then reported that he cannot take a long-acting injectable as he is awaiting to learn if the [facility] has accepted for him to go to the [facility]  "I nominated myself for a [facility]  "I have to go because they are raping the [employer] and starting a genocide  Patient continues to lack insight into his ongoing delusional thought content  Patient denying depression, suicidal ideations, homicidal ideations  Patient reports that he slept well last evening      Current medications:    Current Facility-Administered Medications:     acetaminophen (TYLENOL) tablet 650 mg, 650 mg, Oral, Q4H PRN, Jevon Gilmore MD    amLODIPine (NORVASC) tablet 10 mg, 10 mg, Oral, Q24H, Jevon Gilmore MD, 10 mg at 08/01/18 1703    benztropine (COGENTIN) tablet 0 5 mg, 0 5 mg, Oral, TID, Marshall Hardy PA-C, 0 5 mg at 08/02/18 0831    benztropine (COGENTIN) tablet 1 mg, 1 mg, Oral, Q4H PRN, Marshall Hardy PA-C, 1 mg at 07/28/18 1952    diphenhydrAMINE (BENADRYL) injection 50 mg, 50 mg, Intramuscular, Q4H PRN, Marshall Hardy PA-C    docusate sodium (COLACE) capsule 100 mg, 100 mg, Oral, Q6H PRN, Marshall Hardy PA-C    haloperidol (HALDOL) tablet 10 mg, 10 mg, Oral, Q4H PRN, Marshall Hardy PA-C, 10 mg at 07/28/18 1953    haloperidol (HALDOL) tablet 10 mg, 10 mg, Oral, TID, Marshall Hardy PA-C, 10 mg at 08/02/18 0831    haloperidol lactate (HALDOL) injection 10 mg, 10 mg, Intramuscular, Q4H PRN, Sarah Roberta, PA-C    hydrOXYzine HCL (ATARAX) tablet 100 mg, 100 mg, Oral, Q6H PRN, Sarah Roberta, PA-C    ibuprofen (MOTRIN) tablet 400 mg, 400 mg, Oral, Q6H PRN, Sarah Olive, PA-C    loperamide (IMODIUM) capsule 2 mg, 2 mg, Oral, Q4H PRN, Sarah Olive, PA-C, 2 mg at 08/02/18 0831    LORazepam (ATIVAN) 2 mg/mL injection 2 mg, 2 mg, Intramuscular, Q6H PRN, Sarah Olive, PA-C    LORazepam (ATIVAN) tablet 1 mg, 1 mg, Oral, Q4H PRN, Sarah Olive, PA-C, 1 mg at 07/29/18 0934    melatonin tablet 9 mg, 9 mg, Oral, HS PRN, Sarah Olive, PA-C    OLANZapine (ZyPREXA) tablet 20 mg, 20 mg, Oral, HS, Sarah Olive, PA-C, 20 mg at 08/01/18 2128    polyethylene glycol (MIRALAX) packet 17 g, 17 g, Oral, BID PRN, Sarah Roberta, PA-C      Objective:     Vital Signs:  Vitals:    08/01/18 0715 08/01/18 0718 08/01/18 1630 08/02/18 0757   BP: 116/71 103/73 113/72 94/66   BP Location: Left arm Left arm Right arm Left arm   Pulse: 70 81 59 76   Resp: 18 18  16   Temp: 97 8 °F (36 6 °C)   98 1 °F (36 7 °C)   TempSrc: Temporal   Temporal   SpO2:       Weight:       Height:             Appearance:  age appropriate and casually dressed   Behavior:  normal   Speech:  normal volume   Mood:  labile   Affect:  increased in intensity   Thought Process:  disorganized and flight of ideas   Thought Content:  delusions  grandiose and persecutory   Perceptual Disturbances: None   Risk Potential: none   Sensorium:  person, place and time   Cognition:  intact   Consciousness:  alert and awake    Attention: poor   Intellect: average   Insight:  poor   Judgment: poor      Motor Activity: no abnormal movements           Recent Labs:  Results Reviewed     Procedure Component Value Units Date/Time    Urine Microscopic [[id number]]  (Abnormal) Collected:  07/19/18 0489    Lab Status:  Final result Specimen:  Urine from Urine, Clean Catch Updated:  07/19/18 1857     RBC, UA 2-4 (A) /hpf WBC, UA 4-10 (A) /hpf      Epithelial Cells Occasional /hpf      Bacteria, UA None Seen /hpf     Rapid drug screen, urine [[id number]]  (Normal) Collected:  07/19/18 1759    Lab Status:  Final result Specimen:  Urine from Urine, Clean Catch Updated:  07/19/18 1839     Amph/Meth UR Negative     Barbiturate Ur Negative     Benzodiazepine Urine Negative     Cocaine Urine Negative     Methadone Urine Negative     Opiate Urine Negative     PCP Ur Negative     THC Urine Negative    Narrative:         FOR MEDICAL PURPOSES ONLY  IF CONFIRMATION NEEDED PLEASE CONTACT THE LAB WITHIN 5 DAYS  Drug Screen Cutoff Levels:  AMPHETAMINE/METHAMPHETAMINES  1000 ng/mL  BARBITURATES     200 ng/mL  BENZODIAZEPINES     200 ng/mL  COCAINE      300 ng/mL  METHADONE      300 ng/mL  OPIATES      300 ng/mL  PHENCYCLIDINE     25 ng/mL  THC       50 ng/mL    Ethanol [[id number]]  (Normal) Collected:  07/19/18 1757    Lab Status:  Final result Specimen:  Blood from Arm, Left Updated:  07/19/18 1826     Ethanol Lvl <10 mg/dL     Comprehensive metabolic panel [[id number]]  (Abnormal) Collected:  07/19/18 1757    Lab Status:  Final result Specimen:  Blood from Arm, Left Updated:  07/19/18 1826     Sodium 144 mmol/L      Potassium 3 5 (L) mmol/L      Chloride 106 mmol/L      CO2 28 mmol/L      Anion Gap 10 mmol/L      BUN 19 mg/dL      Creatinine 1 02 mg/dL      Glucose 117 (H) mg/dL      Calcium 9 6 mg/dL      AST 41 U/L      ALT 49 U/L      Alkaline Phosphatase 72 U/L      Total Protein 7 8 g/dL      Albumin 4 6 g/dL      Total Bilirubin 0 60 mg/dL      eGFR 86 ml/min/1 73sq m     Narrative:         National Kidney Disease Education Program recommendations are as follows:  GFR calculation is accurate only with a steady state creatinine  Chronic Kidney disease less than 60 ml/min/1 73 sq  meters  Kidney failure less than 15 ml/min/1 73 sq  meters      UA w Reflex to Microscopic [[id number]]  (Abnormal) Collected:  07/19/18 1759    Lab Status:  Final result Specimen:  Urine from Urine, Clean Catch Updated:  07/19/18 1823     Color, UA Yellow     Clarity, UA Clear     Specific Gravity, UA 1 025     pH, UA 5 0     Leukocytes, UA 25 0 (A)     Nitrite, UA Negative     Protein, UA 30 (1+) (A) mg/dl      Glucose, UA Negative mg/dl      Ketones, UA Negative mg/dl      Bilirubin, UA Negative     Blood, UA 10 0 (A)     UROBILINOGEN UA Negative mg/dL     CBC and differential [[id number]]  (Abnormal) Collected:  07/19/18 1757    Lab Status:  Final result Specimen:  Blood from Arm, Left Updated:  07/19/18 1811     WBC 7 20 Thousand/uL      RBC 4 35 (L) Million/uL      Hemoglobin 13 4 (L) g/dL      Hematocrit 39 8 (L) %      MCV 92 fL      MCH 30 9 pg      MCHC 33 8 g/dL      RDW 14 1 %      MPV 9 0 fL      Platelets 495 Thousands/uL      Neutrophils Relative 58 %      Lymphocytes Relative 32 %      Monocytes Relative 8 %      Eosinophils Relative 2 %      Basophils Relative 1 %      Neutrophils Absolute 4 20 Thousands/µL      Lymphocytes Absolute 2 30 Thousands/µL      Monocytes Absolute 0 60 Thousand/µL      Eosinophils Absolute 0 10 Thousand/µL      Basophils Absolute 0 10 Thousands/µL           I/O Past 24 hours:  I/O last 3 completed shifts:  In: -   Out: 1 [Stool:1]  No intake/output data recorded  Assessment / Plan:     Schizoaffective disorder, bipolar type ([facility]    Recommended Treatment:      Medication changes:  Continue to monitor with compliance to Haldol and Zyprexa dosing  Non-pharmacological treatments  1) Continue with group therapy, milieu therapy and occupational therapy  Safety  1) Safety/communication plan established targeting dynamic risk factors above  2) Risks, benefits, and possible side effects of medications explained to patient and patient verbalizes understanding  Counseling / Coordination of Care    Total floor / unit time spent today 20 minutes   Greater than 50% of total time was spent with the patient and / or family counseling and / or coordination of care  A description of the counseling / coordination of care  Patient's Rights, confidentiality and exceptions to confidentiality, use of automated medical record, [facility] staff access to medical record, and consent to treatment reviewed      Romie Tsai PA-C

## 2018-08-03 RX ADMIN — OLANZAPINE 20 MG: 10 TABLET, FILM COATED ORAL at 21:36

## 2018-08-03 RX ADMIN — LOPERAMIDE HYDROCHLORIDE 2 MG: 2 CAPSULE ORAL at 17:36

## 2018-08-03 RX ADMIN — LOPERAMIDE HYDROCHLORIDE 2 MG: 2 CAPSULE ORAL at 08:43

## 2018-08-03 RX ADMIN — BENZTROPINE MESYLATE 0.5 MG: 0.5 TABLET ORAL at 08:42

## 2018-08-03 RX ADMIN — HALOPERIDOL 10 MG: 5 TABLET ORAL at 21:36

## 2018-08-03 RX ADMIN — BENZTROPINE MESYLATE 0.5 MG: 0.5 TABLET ORAL at 21:36

## 2018-08-03 RX ADMIN — HALOPERIDOL 10 MG: 5 TABLET ORAL at 08:42

## 2018-08-03 RX ADMIN — BENZTROPINE MESYLATE 0.5 MG: 0.5 TABLET ORAL at 17:23

## 2018-08-03 RX ADMIN — HALOPERIDOL 10 MG: 5 TABLET ORAL at 17:22

## 2018-08-03 NOTE — PLAN OF CARE
Alteration in Orientation     Attend and participate in unit activities, including therapeutic, recreational, and educational groups Progressing        Alteration in Thoughts and Perception     Verbalize thoughts and feelings Progressing        Depression     Verbalize thoughts and feelings Progressing        Ineffective Coping     Identifies ineffective coping skills Progressing     Participates in unit activities Progressing        Risk for Self Injury/Neglect     Verbalize thoughts and feelings Progressing        Risk for Violence/Aggression Toward Others     Verbalize thoughts and feelings Progressing        Continues to worry about discharge  Imodium 2 mg po prn at 1736 for c/o diarrhea  Will continue to monitor  Norvasc not given,low b/p

## 2018-08-03 NOTE — PROGRESS NOTES
Pt slept throughout the entire night  Pt did not wake up at all to request any PRNs  Will continue to monitor

## 2018-08-03 NOTE — PROGRESS NOTES
Psychiatry Progress Note    Subjective: Interval History     Patient agitated this morning  Patient is short and abrupt during assessment  Patient demanding a discharge date  Patient had extensive discussion with attending psychiatrist yesterday how he did not have a definitive discharge date at this time as his treatment continues  Patient stating that he is going to refuse to take his medications because he does not need medications and does not need to be in the hospital   Patient was unwilling to accept education about his symptoms and diagnosis and need for treatment  Patient continued to have ongoing mood lability      Current medications:    Current Facility-Administered Medications:     acetaminophen (TYLENOL) tablet 650 mg, 650 mg, Oral, Q4H PRN, Rupa Briones MD    amLODIPine (NORVASC) tablet 10 mg, 10 mg, Oral, Q24H, Rupa Briones MD, 10 mg at 08/01/18 1703    benztropine (COGENTIN) tablet 0 5 mg, 0 5 mg, Oral, TID, Ethelda Eloy, PA-C, 0 5 mg at 08/03/18 8245    benztropine (COGENTIN) tablet 1 mg, 1 mg, Oral, Q4H PRN, Ethelda Eloy, PA-C, 1 mg at 07/28/18 1952    diphenhydrAMINE (BENADRYL) injection 50 mg, 50 mg, Intramuscular, Q4H PRN, Jaya Lyons, PA-C    docusate sodium (COLACE) capsule 100 mg, 100 mg, Oral, Q6H PRN, Ethnayea Eloy, PA-C    haloperidol (HALDOL) tablet 10 mg, 10 mg, Oral, Q4H PRN, Ethelda Eloy, PA-C, 10 mg at 07/28/18 1953    haloperidol (HALDOL) tablet 10 mg, 10 mg, Oral, TID, Ethelda Blackstone, PA-C, 10 mg at 08/03/18 3796    haloperidol lactate (HALDOL) injection 10 mg, 10 mg, Intramuscular, Q4H PRN, Ethelda Blackstone, PA-C    hydrOXYzine HCL (ATARAX) tablet 100 mg, 100 mg, Oral, Q6H PRN, Ethelda Eloy, PA-C    ibuprofen (MOTRIN) tablet 400 mg, 400 mg, Oral, Q6H PRN, Ethnayea Blackstone, PA-C    loperamide (IMODIUM) capsule 2 mg, 2 mg, Oral, Q4H PRN, Jaya Lyons PA-C, 2 mg at 08/03/18 0843    LORazepam (ATIVAN) 2 mg/mL injection 2 mg, 2 mg, Intramuscular, Q6H PRN, Marshall Hardy PA-C    LORazepam (ATIVAN) tablet 1 mg, 1 mg, Oral, Q4H PRN, Marshall Hardy, PA-C, 1 mg at 07/29/18 0934    melatonin tablet 9 mg, 9 mg, Oral, HS PRN, Marshall Hardy PA-C    OLANZapine (ZyPREXA) tablet 20 mg, 20 mg, Oral, HS, Marshall Hardy, PA-C, 20 mg at 08/02/18 2124    polyethylene glycol (MIRALAX) packet 17 g, 17 g, Oral, BID PRN, DAVID Chris-C      Objective:     Vital Signs:  Vitals:    08/02/18 0757 08/02/18 1605 08/03/18 0842 08/03/18 0848   BP: 94/66 102/73 (!) 89/61 (!) 87/63   BP Location: Left arm  Left arm Left arm   Pulse: 76  98 (!) 108   Resp: 16  19    Temp: 98 1 °F (36 7 °C)  (!) 97 1 °F (36 2 °C)    TempSrc: Temporal  Temporal    SpO2:       Weight:       Height:             Appearance:  age appropriate and casually dressed   Behavior:  normal   Speech:  normal volume   Mood:  labile   Affect:  increased in intensity   Thought Process:  disorganized and flight of ideas   Thought Content:  delusions  grandiose and persecutory   Perceptual Disturbances: None   Risk Potential: none   Sensorium:  person, place and time   Cognition:  intact   Consciousness:  alert and awake    Attention: poor   Intellect: average   Insight:  poor   Judgment: poor      Motor Activity: no abnormal movements           Recent Labs:  Results Reviewed     Procedure Component Value Units Date/Time    Urine Microscopic [[id number]]  (Abnormal) Collected:  07/19/18 1759    Lab Status:  Final result Specimen:  Urine from Urine, Clean Catch Updated:  07/19/18 1857     RBC, UA 2-4 (A) /hpf      WBC, UA 4-10 (A) /hpf      Epithelial Cells Occasional /hpf      Bacteria, UA None Seen /hpf     Rapid drug screen, urine [[id number]]  (Normal) Collected:  07/19/18 1759    Lab Status:  Final result Specimen:  Urine from Urine, Clean Catch Updated:  07/19/18 1839     Amph/Meth UR Negative     Barbiturate Ur Negative     Benzodiazepine Urine Negative     Cocaine Urine Negative     Methadone Urine Negative     Opiate Urine Negative     PCP Ur Negative     THC Urine Negative    Narrative:         FOR MEDICAL PURPOSES ONLY  IF CONFIRMATION NEEDED PLEASE CONTACT THE LAB WITHIN 5 DAYS  Drug Screen Cutoff Levels:  AMPHETAMINE/METHAMPHETAMINES  1000 ng/mL  BARBITURATES     200 ng/mL  BENZODIAZEPINES     200 ng/mL  COCAINE      300 ng/mL  METHADONE      300 ng/mL  OPIATES      300 ng/mL  PHENCYCLIDINE     25 ng/mL  THC       50 ng/mL    Ethanol [[id number]]  (Normal) Collected:  07/19/18 1757    Lab Status:  Final result Specimen:  Blood from Arm, Left Updated:  07/19/18 1826     Ethanol Lvl <10 mg/dL     Comprehensive metabolic panel [[id number]]  (Abnormal) Collected:  07/19/18 1757    Lab Status:  Final result Specimen:  Blood from Arm, Left Updated:  07/19/18 1826     Sodium 144 mmol/L      Potassium 3 5 (L) mmol/L      Chloride 106 mmol/L      CO2 28 mmol/L      Anion Gap 10 mmol/L      BUN 19 mg/dL      Creatinine 1 02 mg/dL      Glucose 117 (H) mg/dL      Calcium 9 6 mg/dL      AST 41 U/L      ALT 49 U/L      Alkaline Phosphatase 72 U/L      Total Protein 7 8 g/dL      Albumin 4 6 g/dL      Total Bilirubin 0 60 mg/dL      eGFR 86 ml/min/1 73sq m     Narrative:         National Kidney Disease Education Program recommendations are as follows:  GFR calculation is accurate only with a steady state creatinine  Chronic Kidney disease less than 60 ml/min/1 73 sq  meters  Kidney failure less than 15 ml/min/1 73 sq  meters      UA w Reflex to Microscopic [[id number]]  (Abnormal) Collected:  07/19/18 1759    Lab Status:  Final result Specimen:  Urine from Urine, Clean Catch Updated:  07/19/18 1823     Color, UA Yellow     Clarity, UA Clear     Specific Gravity, UA 1 025     pH, UA 5 0     Leukocytes, UA 25 0 (A)     Nitrite, UA Negative     Protein, UA 30 (1+) (A) mg/dl      Glucose, UA Negative mg/dl      Ketones, UA Negative mg/dl      Bilirubin, UA Negative     Blood, UA 10 0 (A) UROBILINOGEN UA Negative mg/dL     CBC and differential [[id number]]  (Abnormal) Collected:  07/19/18 1757    Lab Status:  Final result Specimen:  Blood from Arm, Left Updated:  07/19/18 1811     WBC 7 20 Thousand/uL      RBC 4 35 (L) Million/uL      Hemoglobin 13 4 (L) g/dL      Hematocrit 39 8 (L) %      MCV 92 fL      MCH 30 9 pg      MCHC 33 8 g/dL      RDW 14 1 %      MPV 9 0 fL      Platelets 777 Thousands/uL      Neutrophils Relative 58 %      Lymphocytes Relative 32 %      Monocytes Relative 8 %      Eosinophils Relative 2 %      Basophils Relative 1 %      Neutrophils Absolute 4 20 Thousands/µL      Lymphocytes Absolute 2 30 Thousands/µL      Monocytes Absolute 0 60 Thousand/µL      Eosinophils Absolute 0 10 Thousand/µL      Basophils Absolute 0 10 Thousands/µL           I/O Past 24 hours:  I/O last 3 completed shifts:  In: -   Out: 1 [Stool:1]  No intake/output data recorded  Assessment / Plan:     [facility]    Recommended Treatment:      Medication changes:  Continue to monitor with compliance to Haldol and Zyprexa dosing  Non-pharmacological treatments  1) Continue with group therapy, milieu therapy and occupational therapy  Safety  1) Safety/communication plan established targeting dynamic risk factors above  2) Risks, benefits, and possible side effects of medications explained to patient and patient verbalizes understanding  Counseling / Coordination of Care    Total floor / unit time spent today 20 minutes  Greater than 50% of total time was spent with the patient and / or family counseling and / or coordination of care  A description of the counseling / coordination of care  Patient's Rights, confidentiality and exceptions to confidentiality, use of automated medical record, [facility] staff access to medical record, and consent to treatment reviewed      Romie Tsai PA-C

## 2018-08-03 NOTE — SOCIAL WORK
Worker informed patient of his [phone] hearing for 8/7  Patient verbalized understanding  Patient asked if he would have a discharge date afterwards, worker reported it will depend upon how the hearing goes  Patient asked about a , worker informed him that he will have a

## 2018-08-03 NOTE — PLAN OF CARE
Problem: Alteration in Thoughts and Perception  Goal: Verbalize thoughts and feelings  Interventions:  - Promote a nonjudgmental and trusting relationship with the patient through active listening and therapeutic communication  - Assess patient's level of functioning, behavior and potential for risk  - Engage patient in 1 on 1 interactions for a minimum of 15 minutes each session  - Encourage patient to express fears, feelings, frustrations, and discuss symptoms    - Gall[place] patient to reality, help patient recognize reality-based thinking   - Administer medications as ordered and assess for potential side effects  - Provide the patient education related to the signs and symptoms of the illness and desired effects of prescribed medications   Outcome: Progressing      Problem: Ineffective Coping  Goal: Identifies ineffective coping skills  Outcome: Progressing    Goal: Participates in unit activities  Interventions:  - Provide therapeutic environment   - Provide required programming   - Redirect inappropriate behaviors    Outcome: Progressing      Problem: Risk for Self Injury/Neglect  Goal: Verbalize thoughts and feelings  Interventions:  - Assess and re-assess patient's lethality and potential for self-injury  - Engage patient in 1:1 interactions, daily, for a minimum of 15 minutes  - Encourage patient to express feelings, fears, frustrations, hopes  - Establish rapport/trust with patient    Outcome: Progressing      Problem: Depression  Goal: Verbalize thoughts and feelings  Interventions:  - Assess and re-assess patient's level of risk   - Engage patient in 1:1 interactions, daily, for a minimum of 15 minutes   - Encourage patient to express feelings, fears, frustrations, hopes    Outcome: Progressing      Problem: Risk for Violence/Aggression Toward Others  Goal: Verbalize thoughts and feelings  Interventions:  - Assess and re-assess patient's level of risk, every waking shift  - Engage patient in 1:1 interactions, daily, for a minimum of 15 minutes   - Allow patient to express feelings and frustrations in a safe and non-threatening manner   - Establish rapport/trust with patient    Outcome: Progressing      Problem: Alteration in Orientation  Goal: Attend and participate in unit activities, including therapeutic, recreational, and educational groups  Interventions:  - Provide therapeutic and educational activities daily, encourage attendance and participation, and document same in the medical record   - Provide appropriate opportunities for reminiscence   - Provide a consistent daily routine   - Encourage family contact/visitation    Outcome: Progressing      Comments: Pt is pleasant and cooperative  Pt seems to be interacting with peers a little more today  Pt denies any anxiety, depression, SI, HI, AH, or VH  Pt attends groups and is med/meal compliant  No complaints of diarrhea were made to this writer today  Will continue to monitor

## 2018-08-03 NOTE — NURSING NOTE
Alisia Miguel talks about his wanting to know when he will be discharged  Writer explained that it depends on his hearing  Again he talked with writer about getting his locks changed to his apartment, but he is unable to get in touch with his landlord  He then asked writer when he gets meds again,and returned to his room

## 2018-08-04 RX ADMIN — HALOPERIDOL 10 MG: 5 TABLET ORAL at 16:37

## 2018-08-04 RX ADMIN — BENZTROPINE MESYLATE 0.5 MG: 0.5 TABLET ORAL at 21:14

## 2018-08-04 RX ADMIN — HALOPERIDOL 10 MG: 5 TABLET ORAL at 08:36

## 2018-08-04 RX ADMIN — BENZTROPINE MESYLATE 0.5 MG: 0.5 TABLET ORAL at 16:37

## 2018-08-04 RX ADMIN — HALOPERIDOL 10 MG: 5 TABLET ORAL at 21:13

## 2018-08-04 RX ADMIN — OLANZAPINE 20 MG: 10 TABLET, FILM COATED ORAL at 21:13

## 2018-08-04 RX ADMIN — BENZTROPINE MESYLATE 0.5 MG: 0.5 TABLET ORAL at 08:37

## 2018-08-04 RX ADMIN — LOPERAMIDE HYDROCHLORIDE 2 MG: 2 CAPSULE ORAL at 08:36

## 2018-08-04 NOTE — PROGRESS NOTES
08/04/18 1400   Activity/Group Checklist   Group Other (Comment)  (Art Therapy Group Process/ Open Choice)   Attendance Attended   Attendance Duration (min) 46-60   Interactions Interacted appropriately   Affect/Mood Appropriate   Goals Achieved Able to recieve feedback; Able to give feedback to another;Able to listen to others; Able to engage in interactions  (Able to engage in materials)     Patient was exploring new art materials and was able to interact with other patients     N Radhames AT Intern  Jenny JOLLEY, ATR-BC

## 2018-08-04 NOTE — PLAN OF CARE
Problem: Alteration in Thoughts and Perception  Goal: Verbalize thoughts and feelings  Interventions:  - Promote a nonjudgmental and trusting relationship with the patient through active listening and therapeutic communication  - Assess patient's level of functioning, behavior and potential for risk  - Engage patient in 1 on 1 interactions for a minimum of 15 minutes each session  - Encourage patient to express fears, feelings, frustrations, and discuss symptoms    - [place] patient to reality, help patient recognize reality-based thinking   - Administer medications as ordered and assess for potential side effects  - Provide the patient education related to the signs and symptoms of the illness and desired effects of prescribed medications   Outcome: Progressing  Patient does not always express his feelings  He needs to be encouraged  Problem: Ineffective Coping  Goal: Identifies ineffective coping skills  Outcome: Progressing    Goal: Participates in unit activities  Interventions:  - Provide therapeutic environment   - Provide required programming   - Redirect inappropriate behaviors    Outcome: Not Progressing  Attends limited unit activities  Problem: Risk for Self Injury/Neglect  Goal: Verbalize thoughts and feelings  Interventions:  - Assess and re-assess patient's lethality and potential for self-injury  - Engage patient in 1:1 interactions, daily, for a minimum of 15 minutes  - Encourage patient to express feelings, fears, frustrations, hopes  - Establish rapport/trust with patient    Outcome: Progressing  Patient has not expressed thoughts of harm      Problem: Depression  Goal: Verbalize thoughts and feelings  Interventions:  - Assess and re-assess patient's level of risk   - Engage patient in 1:1 interactions, daily, for a minimum of 15 minutes   - Encourage patient to express feelings, fears, frustrations, hopes    Outcome: Progressing  Patient is not always comfortable expressing himself  Problem: Risk for Violence/Aggression Toward Others  Goal: Verbalize thoughts and feelings  Interventions:  - Assess and re-assess patient's level of risk, every waking shift  - Engage patient in 1:1 interactions, daily, for a minimum of 15 minutes   - Allow patient to express feelings and frustrations in a safe and non-threatening manner   - Establish rapport/trust with patient    Outcome: Progressing  Low risk currently    Problem: Alteration in Orientation  Goal: Attend and participate in unit activities, including therapeutic, recreational, and educational groups  Interventions:  - Provide therapeutic and educational activities daily, encourage attendance and participation, and document same in the medical record   - Provide appropriate opportunities for reminiscence   - Provide a consistent daily routine   - Encourage family contact/visitation    Outcome: Progressing  Progressing but continues with delusions

## 2018-08-04 NOTE — PROGRESS NOTES
Patient has been visible on the unit more today than other days  He continues to interact with staff members but does not actively interact with peers  He has been compliant with meals/meds today  He continues to request imodium for diarrhea  He is also reporting some abdominal cramping  Will notify medical to obtain appropriate interventions  Staff will continue to monitor for safety and well being

## 2018-08-04 NOTE — PROGRESS NOTES
Patient more visible this afternoon, sitting in small group room watching TV with others  More talkative with this nurse  Unsure if patient is having diarrhea or just change in consistency  He reports he is only going once daily in the mornings  He does continue to have some crampy abdominal pains  Med compliant this afternoon  Will continue to monitor

## 2018-08-04 NOTE — PROGRESS NOTES
08/04/18 1000   Activity/Group Checklist   Group Other (Comment)  (Art Therapy Group Process/ Open Choice)   Attendance Attended   Attendance Duration (min) 46-60   Interactions Interacted appropriately   Affect/Mood Appropriate   Goals Achieved Able to listen to others; Able to engage in interactions; Able to recieve feedback; Able to give feedback to another  (Able to engage in materials)     Patient was alert and noted improvement in thought organization with intern and patients  Able to engage in discussion and interaction with others    N [place] AT Intern  Tristan Kemp MPS, ATR-BC

## 2018-08-04 NOTE — PLAN OF CARE
Problem: PSYCHOSIS  Goal: Will report no hallucinations or delusions  Interventions: Kathie JOLLEY, ATR-BC  - Encourage to attend and participate in art therapy once he is off 1:1   - Provide container for psychosis/delusions  - Provide means to improve organization      Outcome: Progressing  Patient attends art therapy regularly with full participation  Improved organization with less delusional content in verbalizations and artwork    Problem: ANXIETY  Goal: Will report anxiety at manageable levels  INTERVENTIONS: Kathie JOLLEY, ATR-BC  - Encourage to attend and participate in art therapy once he is off 1:1   - Provide means to diffuse, manage and address anxiety  - Explore concepts of ownership, awareness and healthy expression      Outcome: Adequate for Discharge  Demonstrates decreased indicators of anxiety through behavior and artwork    Problem: SELF CARE DEFICIT  Goal: Return ADL status to a safe level of function  INTERVENTIONS: Kathie JOLLEY, ATR-BC  - Encourage to attend and participate in art therapy once he is off 1:1   - Provide means incorporate new coping strategies and self care  - Explore concepts of alternative perspective, awareness  and healthy expression     Outcome: Progressing  Patient attends art therapy regularly with full participation  Patient continues to avoid addressing issues, ownership and awareness of self

## 2018-08-04 NOTE — PROGRESS NOTES
Psychiatry Progress Note    Subjective: Interval History     Patient remains irritable  States he is being "drugged up" with psychotropic medications, claims he needs his discharge date established ASAP and asks that I "check into the situation " Slept well, denies hallucinations  Denies HI SI  Claims the psychotropic medications have put him into "heart failure," although there is no information supporting his allegations       Current medications:    Current Facility-Administered Medications:     acetaminophen (TYLENOL) tablet 650 mg, 650 mg, Oral, Q4H PRN, Akria Aldana MD    amLODIPine (NORVASC) tablet 10 mg, 10 mg, Oral, Q24H, Akira Aldana MD, 10 mg at 08/01/18 1703    benztropine (COGENTIN) tablet 0 5 mg, 0 5 mg, Oral, TID, Mary Ramos, PA-C, 0 5 mg at 08/04/18 1637    benztropine (COGENTIN) tablet 1 mg, 1 mg, Oral, Q4H PRN, Mary Ramos, PA-C, 1 mg at 07/28/18 1952    diphenhydrAMINE (BENADRYL) injection 50 mg, 50 mg, Intramuscular, Q4H PRN, Mary Ramos, PA-C    docusate sodium (COLACE) capsule 100 mg, 100 mg, Oral, Q6H PRN, Mary Ramos, PA-C    haloperidol (HALDOL) tablet 10 mg, 10 mg, Oral, Q4H PRN, Mary Custard, PA-C, 10 mg at 07/28/18 1953    haloperidol (HALDOL) tablet 10 mg, 10 mg, Oral, TID, Mary Ramos, PA-C, 10 mg at 08/04/18 1637    haloperidol lactate (HALDOL) injection 10 mg, 10 mg, Intramuscular, Q4H PRN, Mary Custadalberto, PA-C    hydrOXYzine HCL (ATARAX) tablet 100 mg, 100 mg, Oral, Q6H PRN, Mary Ramos, PA-C    ibuprofen (MOTRIN) tablet 400 mg, 400 mg, Oral, Q6H PRN, Mary Ramos, PA-C    loperamide (IMODIUM) capsule 2 mg, 2 mg, Oral, Q4H PRN, Mary Ramos, PA-C, 2 mg at 08/04/18 0836    LORazepam (ATIVAN) 2 mg/mL injection 2 mg, 2 mg, Intramuscular, Q6H PRN, Mary Ramos PA-C    LORazepam (ATIVAN) tablet 1 mg, 1 mg, Oral, Q4H PRN, Mary Ramos PA-C, 1 mg at 07/29/18 0934    melatonin tablet 9 mg, 9 mg, Oral, HS PRN, Neysa Apley, PA-C    OLANZapine (ZyPREXA) tablet 20 mg, 20 mg, Oral, HS, Neysa Apley, PA-C, 20 mg at 08/03/18 2136    polyethylene glycol (MIRALAX) packet 17 g, 17 g, Oral, BID PRN, Neysa Apley, PA-C      Objective:     Vital Signs:  Vitals:    08/03/18 0848 08/03/18 1700 08/04/18 0759 08/04/18 0800   BP: (!) 87/63 96/76 (!) 86/56 91/68   BP Location: Left arm Left arm Left arm Left arm   Pulse: (!) 108 89 85 95   Resp:   16    Temp:   98 5 °F (36 9 °C)    TempSrc:   Temporal    SpO2:       Weight:       Height:             Appearance:  age appropriate and casually dressed   Behavior:  normal   Speech:  normal volume   Mood:  labile   Affect:  increased in intensity   Thought Process:  disorganized and flight of ideas   Thought Content:  delusions  grandiose and persecutory   Perceptual Disturbances: None   Risk Potential: none   Sensorium:  person, place and time   Cognition:  intact   Consciousness:  alert and awake    Attention: poor   Intellect: average   Insight:  poor   Judgment: poor      Motor Activity: no abnormal movements           Recent Labs:  Results Reviewed     Procedure Component Value Units Date/Time    Urine Microscopic [[id number]]  (Abnormal) Collected:  07/19/18 1759    Lab Status:  Final result Specimen:  Urine from Urine, Clean Catch Updated:  07/19/18 1857     RBC, UA 2-4 (A) /hpf      WBC, UA 4-10 (A) /hpf      Epithelial Cells Occasional /hpf      Bacteria, UA None Seen /hpf     Rapid drug screen, urine [[id number]]  (Normal) Collected:  07/19/18 1759    Lab Status:  Final result Specimen:  Urine from Urine, Clean Catch Updated:  07/19/18 1839     Amph/Meth UR Negative     Barbiturate Ur Negative     Benzodiazepine Urine Negative     Cocaine Urine Negative     Methadone Urine Negative     Opiate Urine Negative     PCP Ur Negative     THC Urine Negative    Narrative:         FOR MEDICAL PURPOSES ONLY  IF CONFIRMATION NEEDED PLEASE CONTACT THE LAB WITHIN 5 DAYS      Drug Screen Cutoff Levels:  AMPHETAMINE/METHAMPHETAMINES  1000 ng/mL  BARBITURATES     200 ng/mL  BENZODIAZEPINES     200 ng/mL  COCAINE      300 ng/mL  METHADONE      300 ng/mL  OPIATES      300 ng/mL  PHENCYCLIDINE     25 ng/mL  THC       50 ng/mL    Ethanol [[id number]]  (Normal) Collected:  07/19/18 1757    Lab Status:  Final result Specimen:  Blood from Arm, Left Updated:  07/19/18 1826     Ethanol Lvl <10 mg/dL     Comprehensive metabolic panel [[id number]]  (Abnormal) Collected:  07/19/18 1757    Lab Status:  Final result Specimen:  Blood from Arm, Left Updated:  07/19/18 1826     Sodium 144 mmol/L      Potassium 3 5 (L) mmol/L      Chloride 106 mmol/L      CO2 28 mmol/L      Anion Gap 10 mmol/L      BUN 19 mg/dL      Creatinine 1 02 mg/dL      Glucose 117 (H) mg/dL      Calcium 9 6 mg/dL      AST 41 U/L      ALT 49 U/L      Alkaline Phosphatase 72 U/L      Total Protein 7 8 g/dL      Albumin 4 6 g/dL      Total Bilirubin 0 60 mg/dL      eGFR 86 ml/min/1 73sq m     Narrative:         National Kidney Disease Education Program recommendations are as follows:  GFR calculation is accurate only with a steady state creatinine  Chronic Kidney disease less than 60 ml/min/1 73 sq  meters  Kidney failure less than 15 ml/min/1 73 sq  meters      UA w Reflex to Microscopic [[id number]]  (Abnormal) Collected:  07/19/18 1759    Lab Status:  Final result Specimen:  Urine from Urine, Clean Catch Updated:  07/19/18 1823     Color, UA Yellow     Clarity, UA Clear     Specific Gravity, UA 1 025     pH, UA 5 0     Leukocytes, UA 25 0 (A)     Nitrite, UA Negative     Protein, UA 30 (1+) (A) mg/dl      Glucose, UA Negative mg/dl      Ketones, UA Negative mg/dl      Bilirubin, UA Negative     Blood, UA 10 0 (A)     UROBILINOGEN UA Negative mg/dL     CBC and differential [[id number]]  (Abnormal) Collected:  07/19/18 1757    Lab Status:  Final result Specimen:  Blood from Arm, Left Updated:  07/19/18 1811     WBC 7 20 Thousand/uL      RBC 4 35 (L) Million/uL      Hemoglobin 13 4 (L) g/dL      Hematocrit 39 8 (L) %      MCV 92 fL      MCH 30 9 pg      MCHC 33 8 g/dL      RDW 14 1 %      MPV 9 0 fL      Platelets 272 Thousands/uL      Neutrophils Relative 58 %      Lymphocytes Relative 32 %      Monocytes Relative 8 %      Eosinophils Relative 2 %      Basophils Relative 1 %      Neutrophils Absolute 4 20 Thousands/µL      Lymphocytes Absolute 2 30 Thousands/µL      Monocytes Absolute 0 60 Thousand/µL      Eosinophils Absolute 0 10 Thousand/µL      Basophils Absolute 0 10 Thousands/µL           I/O Past 24 hours:  No intake/output data recorded  No intake/output data recorded  Assessment / Plan:     Schizoaffective disorder, bipolar type ([facility] )    Recommended Treatment:      Medication changes:  Continue to monitor with compliance to Haldol and Zyprexa dosing  Non-pharmacological treatments  1) Continue with group therapy, milieu therapy and occupational therapy  Safety  1) Safety/communication plan established targeting dynamic risk factors above  2) Risks, benefits, and possible side effects of medications explained to patient and patient verbalizes understanding  Counseling / Coordination of Care    Total floor / unit time spent today 20 minutes  Greater than 50% of total time was spent with the patient and / or family counseling and / or coordination of care  A description of the counseling / coordination of care  Patient's Rights, confidentiality and exceptions to confidentiality, use of automated medical record, [facility] staff access to medical record, and consent to treatment reviewed      BRENDON Ahn

## 2018-08-05 RX ADMIN — BENZTROPINE MESYLATE 0.5 MG: 0.5 TABLET ORAL at 07:57

## 2018-08-05 RX ADMIN — OLANZAPINE 20 MG: 10 TABLET, FILM COATED ORAL at 21:13

## 2018-08-05 RX ADMIN — BENZTROPINE MESYLATE 0.5 MG: 0.5 TABLET ORAL at 21:13

## 2018-08-05 RX ADMIN — HALOPERIDOL 10 MG: 5 TABLET ORAL at 07:57

## 2018-08-05 RX ADMIN — HALOPERIDOL 10 MG: 5 TABLET ORAL at 21:13

## 2018-08-05 RX ADMIN — BENZTROPINE MESYLATE 0.5 MG: 0.5 TABLET ORAL at 16:01

## 2018-08-05 RX ADMIN — HALOPERIDOL 10 MG: 5 TABLET ORAL at 16:01

## 2018-08-05 NOTE — NURSING NOTE
Patient visible at times this evening  Reports no issues related to sleep  Behaviors cooperative and appropriate  Patient does not interact well with peers but does come to staff to have his needs met  Patient went to bed before HS medications but did ask nurse if he could have them at 2000 instead of 2100  Patient reported he likes to go to bed early  Sleeps well with complaints of any kind this shift  No PRN medications requested prior going to bed

## 2018-08-05 NOTE — NURSING NOTE
Patient isolative to self all evening  Visible at times but does not interact with staff or peers  Does comes to staff to have his needs met if necessary  Patient went to bed this evening before getting his HS medications  No PRN medications required or requested this shift  Goes to bed early and sleeps well with no sleep aids

## 2018-08-05 NOTE — PROGRESS NOTES
Patient is visible on unit   Can get upset over the  Smallest thing at times  Patient does not  Think he has any problems  If he  Feels comfortable with staff he will carry on a conversation if he doesn't he kinds of back off  He refuses his blood pressure medicine because he feels it hurts him more then helps him   Will continue to offer support and encourage groups

## 2018-08-05 NOTE — PROGRESS NOTES
Psychiatry Progress Note    Subjective: Interval History     Patient remains irritable  Asking to have medications reduced  Doesn't feel he is being given "the truth" in regards to a discharge date and asks for me to assure him that he will be given this information tomorrow   He feels the medications will give him "heart failure" and will "cause more problems than help " Denies HI SI     Current medications:    Current Facility-Administered Medications:     acetaminophen (TYLENOL) tablet 650 mg, 650 mg, Oral, Q4H PRN, Rupa Briones MD    amLODIPine (NORVASC) tablet 10 mg, 10 mg, Oral, Q24H, Rupa Briones MD, 10 mg at 08/01/18 1703    benztropine (COGENTIN) tablet 0 5 mg, 0 5 mg, Oral, TID, Ethelda Eloy, PA-C, 0 5 mg at 08/05/18 0757    benztropine (COGENTIN) tablet 1 mg, 1 mg, Oral, Q4H PRN, Ethelda Eloy, PA-C, 1 mg at 07/28/18 1952    diphenhydrAMINE (BENADRYL) injection 50 mg, 50 mg, Intramuscular, Q4H PRN, Ethelda Overlea, PA-C    docusate sodium (COLACE) capsule 100 mg, 100 mg, Oral, Q6H PRN, Ethelda Eloy, PA-C    haloperidol (HALDOL) tablet 10 mg, 10 mg, Oral, Q4H PRN, Ethelda Overlea, PA-C, 10 mg at 07/28/18 1953    haloperidol (HALDOL) tablet 10 mg, 10 mg, Oral, TID, Ethelda Eloy, PA-C, 10 mg at 08/05/18 0757    haloperidol lactate (HALDOL) injection 10 mg, 10 mg, Intramuscular, Q4H PRN, Ethelda Overlea, PA-C    hydrOXYzine HCL (ATARAX) tablet 100 mg, 100 mg, Oral, Q6H PRN, Ethelda Eloy, PA-C    ibuprofen (MOTRIN) tablet 400 mg, 400 mg, Oral, Q6H PRN, Ethelda Overlea, PA-C    loperamide (IMODIUM) capsule 2 mg, 2 mg, Oral, Q4H PRN, Ethelda Overlea, PA-C, 2 mg at 08/04/18 0836    LORazepam (ATIVAN) 2 mg/mL injection 2 mg, 2 mg, Intramuscular, Q6H PRN, Jaya Lyons PA-C    LORazepam (ATIVAN) tablet 1 mg, 1 mg, Oral, Q4H PRN, Jaya Lyons PA-C, 1 mg at 07/29/18 0934    melatonin tablet 9 mg, 9 mg, Oral, HS PRN, BRANDON Paredes (ZyPREXA) tablet 20 mg, 20 mg, Oral, HS, Delana Gosselin, PA-C, 20 mg at 08/04/18 2113    polyethylene glycol (MIRALAX) packet 17 g, 17 g, Oral, BID PRN, Delana Gosselin, PA-C      Objective:     Vital Signs:  Vitals:    08/04/18 0759 08/04/18 0800 08/05/18 0643 08/05/18 0645   BP: (!) 86/56 91/68 113/80 113/75   BP Location: Left arm Left arm Left arm Left arm   Pulse: 85 95 57 55   Resp: 16 18 18   Temp: 98 5 °F (36 9 °C)  (!) 96 6 °F (35 9 °C)    TempSrc: Temporal  Temporal    SpO2:   100%    Weight:    69 9 kg (154 lb)   Height:             Appearance:  age appropriate and casually dressed   Behavior:  normal   Speech:  normal volume   Mood:  labile   Affect:  increased in intensity   Thought Process:  disorganized and flight of ideas   Thought Content:  delusions  grandiose and persecutory   Perceptual Disturbances: None   Risk Potential: none   Sensorium:  person, place and time   Cognition:  intact   Consciousness:  alert and awake    Attention: poor   Intellect: average   Insight:  poor   Judgment: poor      Motor Activity: no abnormal movements           Recent Labs:  Results Reviewed     Procedure Component Value Units Date/Time    Urine Microscopic [[id number]]  (Abnormal) Collected:  07/19/18 1759    Lab Status:  Final result Specimen:  Urine from Urine, Clean Catch Updated:  07/19/18 1857     RBC, UA 2-4 (A) /hpf      WBC, UA 4-10 (A) /hpf      Epithelial Cells Occasional /hpf      Bacteria, UA None Seen /hpf     Rapid drug screen, urine [[id number]]  (Normal) Collected:  07/19/18 1759    Lab Status:  Final result Specimen:  Urine from Urine, Clean Catch Updated:  07/19/18 1839     Amph/Meth UR Negative     Barbiturate Ur Negative     Benzodiazepine Urine Negative     Cocaine Urine Negative     Methadone Urine Negative     Opiate Urine Negative     PCP Ur Negative     THC Urine Negative    Narrative:         FOR MEDICAL PURPOSES ONLY     IF CONFIRMATION NEEDED PLEASE CONTACT THE LAB WITHIN 5 DAYS     Drug Screen Cutoff Levels:  AMPHETAMINE/METHAMPHETAMINES  1000 ng/mL  BARBITURATES     200 ng/mL  BENZODIAZEPINES     200 ng/mL  COCAINE      300 ng/mL  METHADONE      300 ng/mL  OPIATES      300 ng/mL  PHENCYCLIDINE     25 ng/mL  THC       50 ng/mL    Ethanol [[id number]]  (Normal) Collected:  07/19/18 1757    Lab Status:  Final result Specimen:  Blood from Arm, Left Updated:  07/19/18 1826     Ethanol Lvl <10 mg/dL     Comprehensive metabolic panel [[id number]]  (Abnormal) Collected:  07/19/18 1757    Lab Status:  Final result Specimen:  Blood from Arm, Left Updated:  07/19/18 1826     Sodium 144 mmol/L      Potassium 3 5 (L) mmol/L      Chloride 106 mmol/L      CO2 28 mmol/L      Anion Gap 10 mmol/L      BUN 19 mg/dL      Creatinine 1 02 mg/dL      Glucose 117 (H) mg/dL      Calcium 9 6 mg/dL      AST 41 U/L      ALT 49 U/L      Alkaline Phosphatase 72 U/L      Total Protein 7 8 g/dL      Albumin 4 6 g/dL      Total Bilirubin 0 60 mg/dL      eGFR 86 ml/min/1 73sq m     Narrative:         National Kidney Disease Education Program recommendations are as follows:  GFR calculation is accurate only with a steady state creatinine  Chronic Kidney disease less than 60 ml/min/1 73 sq  meters  Kidney failure less than 15 ml/min/1 73 sq  meters      UA w Reflex to Microscopic [[id number]]  (Abnormal) Collected:  07/19/18 1759    Lab Status:  Final result Specimen:  Urine from Urine, Clean Catch Updated:  07/19/18 1823     Color, UA Yellow     Clarity, UA Clear     Specific Gravity, UA 1 025     pH, UA 5 0     Leukocytes, UA 25 0 (A)     Nitrite, UA Negative     Protein, UA 30 (1+) (A) mg/dl      Glucose, UA Negative mg/dl      Ketones, UA Negative mg/dl      Bilirubin, UA Negative     Blood, UA 10 0 (A)     UROBILINOGEN UA Negative mg/dL     CBC and differential [[id number]]  (Abnormal) Collected:  07/19/18 1757    Lab Status:  Final result Specimen:  Blood from Arm, Left Updated:  07/19/18 1811     WBC 7 20 Thousand/uL RBC 4 35 (L) Million/uL      Hemoglobin 13 4 (L) g/dL      Hematocrit 39 8 (L) %      MCV 92 fL      MCH 30 9 pg      MCHC 33 8 g/dL      RDW 14 1 %      MPV 9 0 fL      Platelets 074 Thousands/uL      Neutrophils Relative 58 %      Lymphocytes Relative 32 %      Monocytes Relative 8 %      Eosinophils Relative 2 %      Basophils Relative 1 %      Neutrophils Absolute 4 20 Thousands/µL      Lymphocytes Absolute 2 30 Thousands/µL      Monocytes Absolute 0 60 Thousand/µL      Eosinophils Absolute 0 10 Thousand/µL      Basophils Absolute 0 10 Thousands/µL           I/O Past 24 hours:  No intake/output data recorded  No intake/output data recorded  Assessment / Plan:     Schizoaffective disorder, bipolar type ([facility] )    Recommended Treatment:      Medication changes:  Continue to monitor with compliance to Haldol and Zyprexa dosing  Non-pharmacological treatments  1) Continue with group therapy, milieu therapy and occupational therapy  Safety  1) Safety/communication plan established targeting dynamic risk factors above  2) Risks, benefits, and possible side effects of medications explained to patient and patient verbalizes understanding  Counseling / Coordination of Care    Total floor / unit time spent today 20 minutes  Greater than 50% of total time was spent with the patient and / or family counseling and / or coordination of care  A description of the counseling / coordination of care  Patient's Rights, confidentiality and exceptions to confidentiality, use of automated medical record, [facility] staff access to medical record, and consent to treatment reviewed      BRENDON Calderon

## 2018-08-06 RX ADMIN — OLANZAPINE 20 MG: 10 TABLET, FILM COATED ORAL at 21:44

## 2018-08-06 RX ADMIN — BENZTROPINE MESYLATE 0.5 MG: 0.5 TABLET ORAL at 21:44

## 2018-08-06 RX ADMIN — HALOPERIDOL 10 MG: 5 TABLET ORAL at 17:18

## 2018-08-06 RX ADMIN — BENZTROPINE MESYLATE 0.5 MG: 0.5 TABLET ORAL at 08:20

## 2018-08-06 RX ADMIN — HALOPERIDOL 10 MG: 5 TABLET ORAL at 08:20

## 2018-08-06 RX ADMIN — HALOPERIDOL 10 MG: 5 TABLET ORAL at 21:44

## 2018-08-06 RX ADMIN — BENZTROPINE MESYLATE 0.5 MG: 0.5 TABLET ORAL at 17:19

## 2018-08-06 NOTE — NURSING NOTE
Patient visible on unit at start of shift  Minimal interaction with peers noted  Patients affect brightens upon approach and denies any symptoms  Patient was HS medication compliant and did not require PRNs  Patient retreated to room for sleep without issue  Will continue to monitor

## 2018-08-06 NOTE — PROGRESS NOTES
Psychiatry Progress Note    Subjective: Interval History      Patient overall has been isolative to his room  Patient presents for medications and meals and then retreats back to his room  Patient still easily irritated over the weekend with some abrupt outbursts  Patient, cooperative with assessment this morning  Patient fixated on 304 hearing that is scheduled for tomorrow morning as he would like discharge date  Patient has been consistently compliant with his medication regimen throughout the weekend  Patient with some reports of diarrhea later last week and abdominal cramping over the weekend however patient reports that this has resolved  Patient with no somatic complaints today  Adverse effects to his medication regimen will continue to be evaluated for  Patient minimizing all psychiatric symptoms during assessment      Current medications:    Current Facility-Administered Medications:     acetaminophen (TYLENOL) tablet 650 mg, 650 mg, Oral, Q4H PRN, Dev Isbell MD    amLODIPine (NORVASC) tablet 10 mg, 10 mg, Oral, Q24H, Dev Isbell MD, 10 mg at 08/01/18 1703    benztropine (COGENTIN) tablet 0 5 mg, 0 5 mg, Oral, TID, Annel Soriano PA-C, 0 5 mg at 08/05/18 2113    benztropine (COGENTIN) tablet 1 mg, 1 mg, Oral, Q4H PRN, Annel Soriano, PA-C, 1 mg at 07/28/18 1952    diphenhydrAMINE (BENADRYL) injection 50 mg, 50 mg, Intramuscular, Q4H PRN, Annel Soriano PA-C    docusate sodium (COLACE) capsule 100 mg, 100 mg, Oral, Q6H PRN, Annel Soriano PA-C    haloperidol (HALDOL) tablet 10 mg, 10 mg, Oral, Q4H PRN, Tylere Bo, PA-C, 10 mg at 07/28/18 1953    haloperidol (HALDOL) tablet 10 mg, 10 mg, Oral, TID, Annel Soriano, PA-C, 10 mg at 08/05/18 2113    haloperidol lactate (HALDOL) injection 10 mg, 10 mg, Intramuscular, Q4H PRN, Annel Soriano PA-C    hydrOXYzine HCL (ATARAX) tablet 100 mg, 100 mg, Oral, Q6H PRN, Annel Soriano PA-C    ibuprofen (MOTRIN) tablet 400 mg, 400 mg, Oral, Q6H PRN, Jayesh Gonsales PA-C    loperamide (IMODIUM) capsule 2 mg, 2 mg, Oral, Q4H PRN, Jayesh Gonsales PA-C, 2 mg at 08/04/18 0836    LORazepam (ATIVAN) 2 mg/mL injection 2 mg, 2 mg, Intramuscular, Q6H PRN, Jayesh Gonsales PA-C    LORazepam (ATIVAN) tablet 1 mg, 1 mg, Oral, Q4H PRN, Jayesh Gonsales PA-C, 1 mg at 07/29/18 0934    melatonin tablet 9 mg, 9 mg, Oral, HS PRN, Jayesh Gonsales PA-C    OLANZapine (ZyPREXA) tablet 20 mg, 20 mg, Oral, HS, DAVID Leonard-ROSA, 20 mg at 08/05/18 2113    polyethylene glycol (MIRALAX) packet 17 g, 17 g, Oral, BID PRN, Jayesh Gonsales PA-C      Objective:     Vital Signs:  Vitals:    08/04/18 0759 08/04/18 0800 08/05/18 0643 08/05/18 0645   BP: (!) 86/56 91/68 113/80 113/75   BP Location: Left arm Left arm Left arm Left arm   Pulse: 85 95 57 55   Resp: 16 18 18   Temp: 98 5 °F (36 9 °C)  (!) 96 6 °F (35 9 °C)    TempSrc: Temporal  Temporal    SpO2:   100%    Weight:    69 9 kg (154 lb)   Height:             Appearance:  age appropriate and casually dressed   Behavior:  normal   Speech:  normal volume   Mood:  labile   Affect:  increased in intensity   Thought Process:  disorganized and flight of ideas   Thought Content:  delusions  grandiose and persecutory   Perceptual Disturbances: None   Risk Potential: none   Sensorium:  person, place and time   Cognition:  intact   Consciousness:  alert and awake    Attention: poor   Intellect: average   Insight:  poor   Judgment: poor      Motor Activity: no abnormal movements           Recent Labs:  Results Reviewed     Procedure Component Value Units Date/Time    Urine Microscopic [[id number]]  (Abnormal) Collected:  07/19/18 1759    Lab Status:  Final result Specimen:  Urine from Urine, Clean Catch Updated:  07/19/18 1857     RBC, UA 2-4 (A) /hpf      WBC, UA 4-10 (A) /hpf      Epithelial Cells Occasional /hpf      Bacteria, UA None Seen /hpf     Rapid drug screen, urine [[id number]] (Normal) Collected:  07/19/18 1759    Lab Status:  Final result Specimen:  Urine from Urine, Clean Catch Updated:  07/19/18 1839     Amph/Meth UR Negative     Barbiturate Ur Negative     Benzodiazepine Urine Negative     Cocaine Urine Negative     Methadone Urine Negative     Opiate Urine Negative     PCP Ur Negative     THC Urine Negative    Narrative:         FOR MEDICAL PURPOSES ONLY  IF CONFIRMATION NEEDED PLEASE CONTACT THE LAB WITHIN 5 DAYS  Drug Screen Cutoff Levels:  AMPHETAMINE/METHAMPHETAMINES  1000 ng/mL  BARBITURATES     200 ng/mL  BENZODIAZEPINES     200 ng/mL  COCAINE      300 ng/mL  METHADONE      300 ng/mL  OPIATES      300 ng/mL  PHENCYCLIDINE     25 ng/mL  THC       50 ng/mL    Ethanol [[id number]]  (Normal) Collected:  07/19/18 1757    Lab Status:  Final result Specimen:  Blood from Arm, Left Updated:  07/19/18 1826     Ethanol Lvl <10 mg/dL     Comprehensive metabolic panel [[id number]]  (Abnormal) Collected:  07/19/18 1757    Lab Status:  Final result Specimen:  Blood from Arm, Left Updated:  07/19/18 1826     Sodium 144 mmol/L      Potassium 3 5 (L) mmol/L      Chloride 106 mmol/L      CO2 28 mmol/L      Anion Gap 10 mmol/L      BUN 19 mg/dL      Creatinine 1 02 mg/dL      Glucose 117 (H) mg/dL      Calcium 9 6 mg/dL      AST 41 U/L      ALT 49 U/L      Alkaline Phosphatase 72 U/L      Total Protein 7 8 g/dL      Albumin 4 6 g/dL      Total Bilirubin 0 60 mg/dL      eGFR 86 ml/min/1 73sq m     Narrative:         National Kidney Disease Education Program recommendations are as follows:  GFR calculation is accurate only with a steady state creatinine  Chronic Kidney disease less than 60 ml/min/1 73 sq  meters  Kidney failure less than 15 ml/min/1 73 sq  meters      UA w Reflex to Microscopic [[id number]]  (Abnormal) Collected:  07/19/18 1759    Lab Status:  Final result Specimen:  Urine from Urine, Clean Catch Updated:  07/19/18 1823     Color, UA Yellow     Clarity, UA Clear     Specific [place], UA 1 025     pH, UA 5 0     Leukocytes, UA 25 0 (A)     Nitrite, UA Negative     Protein, UA 30 (1+) (A) mg/dl      Glucose, UA Negative mg/dl      Ketones, UA Negative mg/dl      Bilirubin, UA Negative     Blood, UA 10 0 (A)     UROBILINOGEN UA Negative mg/dL     CBC and differential [[id number]]  (Abnormal) Collected:  07/19/18 1757    Lab Status:  Final result Specimen:  Blood from Arm, Left Updated:  07/19/18 1811     WBC 7 20 Thousand/uL      RBC 4 35 (L) Million/uL      Hemoglobin 13 4 (L) g/dL      Hematocrit 39 8 (L) %      MCV 92 fL      MCH 30 9 pg      MCHC 33 8 g/dL      RDW 14 1 %      MPV 9 0 fL      Platelets 739 Thousands/uL      Neutrophils Relative 58 %      Lymphocytes Relative 32 %      Monocytes Relative 8 %      Eosinophils Relative 2 %      Basophils Relative 1 %      Neutrophils Absolute 4 20 Thousands/µL      Lymphocytes Absolute 2 30 Thousands/µL      Monocytes Absolute 0 60 Thousand/µL      Eosinophils Absolute 0 10 Thousand/µL      Basophils Absolute 0 10 Thousands/µL           I/O Past 24 hours:  No intake/output data recorded  No intake/output data recorded  Assessment / Plan:     Schizoaffective disorder, bipolar type ([facility] [address] )    Recommended Treatment:      Medication changes:  Continue current psychotropic medication regimen  Non-pharmacological treatments  1) Continue with group therapy, milieu therapy and occupational therapy  Safety  1) Safety/communication plan established targeting dynamic risk factors above  2) Risks, benefits, and possible side effects of medications explained to patient and patient verbalizes understanding  Counseling / Coordination of Care    Total floor / unit time spent today 20 minutes  Greater than 50% of total time was spent with the patient and / or family counseling and / or coordination of care  A description of the counseling / coordination of care       Patient's Rights, confidentiality and exceptions to confidentiality, use of automated medical record, [address] [place] [address] staff access to medical record, and consent to treatment reviewed      Edward Rivero PA-C

## 2018-08-06 NOTE — PLAN OF CARE
PSYCHOSIS     Will report no hallucinations or delusions Completed          Alteration in Orientation     Attend and participate in unit activities, including therapeutic, recreational, and educational groups Progressing        Alteration in Thoughts and Perception     Verbalize thoughts and feelings Progressing        Depression     Verbalize thoughts and feelings Progressing        Ineffective Coping     Identifies ineffective coping skills Progressing     Participates in unit activities Progressing        Risk for Self Injury/Neglect     Verbalize thoughts and feelings Progressing        Risk for Violence/Aggression Toward Others     Verbalize thoughts and feelings Progressing          Client continues to report no hallucinations  Is visual on the unit and participates in some groups  He does not interact with many other clients, but does talk when he is addressed  He denies anxiety, depression, HI and SI  He will continue to be monitored

## 2018-08-07 RX ORDER — OLANZAPINE 10 MG/1
10 TABLET ORAL DAILY
Status: DISCONTINUED | OUTPATIENT
Start: 2018-08-08 | End: 2018-08-15

## 2018-08-07 RX ADMIN — HALOPERIDOL 10 MG: 5 TABLET ORAL at 21:43

## 2018-08-07 RX ADMIN — NICOTINE POLACRILEX 2 MG: 2 GUM, CHEWING ORAL at 17:39

## 2018-08-07 RX ADMIN — BENZTROPINE MESYLATE 0.5 MG: 0.5 TABLET ORAL at 21:43

## 2018-08-07 RX ADMIN — NICOTINE POLACRILEX 2 MG: 2 GUM, CHEWING ORAL at 11:17

## 2018-08-07 RX ADMIN — AMLODIPINE BESYLATE 10 MG: 5 TABLET ORAL at 16:53

## 2018-08-07 RX ADMIN — BENZTROPINE MESYLATE 0.5 MG: 0.5 TABLET ORAL at 08:27

## 2018-08-07 RX ADMIN — OLANZAPINE 20 MG: 10 TABLET, FILM COATED ORAL at 21:43

## 2018-08-07 RX ADMIN — BENZTROPINE MESYLATE 0.5 MG: 0.5 TABLET ORAL at 16:53

## 2018-08-07 RX ADMIN — HALOPERIDOL 10 MG: 5 TABLET ORAL at 16:55

## 2018-08-07 RX ADMIN — HALOPERIDOL 10 MG: 5 TABLET ORAL at 08:27

## 2018-08-07 NOTE — PLAN OF CARE
Alteration in Orientation     Attend and participate in unit activities, including therapeutic, recreational, and educational groups Progressing        Alteration in Thoughts and Perception     Verbalize thoughts and feelings Progressing        Depression     Verbalize thoughts and feelings Progressing        Ineffective Coping     Identifies ineffective coping skills Progressing     Participates in unit activities Progressing        Risk for Self Injury/Neglect     Verbalize thoughts and feelings Progressing        Risk for Violence/Aggression Toward Others     Verbalize thoughts and feelings Progressing          Client present with bright affect  Denies HI, SI, Anxiety and depression  Approached the med door this morning and stated he was "verbous" at his 304 hearing  "I don't think I impressed them," he stated  Client has approached this nurse several times asking when his next dose of medication is due  He has been educated on times and medications  He will continue to be monitored

## 2018-08-07 NOTE — PROGRESS NOTES
08/07/18 3545   Activity/Group Checklist   Group Other (Comment)  (Art Therapy Process Group / Visual Introduction)   Attendance Attended   Attendance Duration (min) Greater than 60   Interactions Interacted appropriately   Affect/Mood Appropriate; Constricted   Goals Achieved Able to listen to others; Able to engage in interactions; Able to manage/cope with feelings; Able to recieve feedback  (Able to engage art materials)     Disjointed and out-of-context comments continue, as does patient's theme of "Christsmas" in his artwork  Patient offers no insight to current mental state, but is easily redirectable

## 2018-08-07 NOTE — PROGRESS NOTES
Patient was compliant with evening medications  Patient was visible on unit until it was time for group  Then patient yelled "i'm not going to group, I'm going to bed"  Patient remained in bed until awoken for meds  Patient did not report any anxiety, depression, pain, SI/HI, or AH/VH at time of assessment  No further issues throughout shift  Will continue to monitor

## 2018-08-07 NOTE — SOCIAL WORK
Patient requested to speak with worker regarding [phone] hearing  Worker informed him that his [phone] hearing was upheld to 90 days  Worker informed that it does not mean he will be here for 90 days  Patient then yelling, "Dr Gee Burgess better give me a discharge date tomorrow"  Patient then stormed out of the room  Worker attempted to educate patient as to why he does not have a discharge date but worker was unsuccessful

## 2018-08-07 NOTE — PROGRESS NOTES
Psychiatry Progress Note    Subjective: Interval History     304 hearing this morning  Patient continues to lack insight into his symptoms and admission  Patient continues to be fixated on wanting a discharge date  Patient reports that he is doing fine and is ready to leave  Patient has been guarded and evasive during assessment  Patient minimizing any psychiatric symptoms in attempts for an earlier discharge  Patient has continued to display ongoing grandiose delusions to staff as he continues to report about going on a mission trip to the [facility]; patient however guarded and unwilling to discuss this during assessment  Ongoing mood lability and becomes easily agitated  Patient has continued to be compliant with his entire psychotropic medication regimen over the past 24 hours  Patient with no somatic complaints  No adverse effects to his medication regimen is noted      Current medications:    Current Facility-Administered Medications:     acetaminophen (TYLENOL) tablet 650 mg, 650 mg, Oral, Q4H PRN, Fay Millan MD    amLODIPine (NORVASC) tablet 10 mg, 10 mg, Oral, Q24H, Fay Millan MD, 10 mg at 08/01/18 1703    benztropine (COGENTIN) tablet 0 5 mg, 0 5 mg, Oral, TID, Erica Shortyey, PA-C, 0 5 mg at 08/07/18 0827    benztropine (COGENTIN) tablet 1 mg, 1 mg, Oral, Q4H PRN, Erica Apley, PA-C, 1 mg at 07/28/18 1952    diphenhydrAMINE (BENADRYL) injection 50 mg, 50 mg, Intramuscular, Q4H PRN, Ericamelina Oroey, PA-C    docusate sodium (COLACE) capsule 100 mg, 100 mg, Oral, Q6H PRN, Erica Apley, PA-C    haloperidol (HALDOL) tablet 10 mg, 10 mg, Oral, Q4H PRN, Erica Apley, PA-C, 10 mg at 07/28/18 1953    haloperidol (HALDOL) tablet 10 mg, 10 mg, Oral, TID, Erica Apley, PA-C, 10 mg at 08/07/18 0827    haloperidol lactate (HALDOL) injection 10 mg, 10 mg, Intramuscular, Q4H PRN, Neysa Apley, PA-C    hydrOXYzine HCL (ATARAX) tablet 100 mg, 100 mg, Oral, Q6H PRN, Luz Sprinkle BRANDON Haque    ibuprofen (MOTRIN) tablet 400 mg, 400 mg, Oral, Q6H PRN, Neysa Apley, PA-C    loperamide (IMODIUM) capsule 2 mg, 2 mg, Oral, Q4H PRN, Neysa Apley, PA-C, 2 mg at 08/04/18 0836    LORazepam (ATIVAN) 2 mg/mL injection 2 mg, 2 mg, Intramuscular, Q6H PRN, Neysa Apley, PA-ROSA    LORazepam (ATIVAN) tablet 1 mg, 1 mg, Oral, Q4H PRN, Neysa Apley, PA-C, 1 mg at 07/29/18 0934    melatonin tablet 9 mg, 9 mg, Oral, HS PRN, Neysa Apley, PA-ROSA    nicotine polacrilex (NICORETTE) gum 2 mg, 2 mg, Oral, Q4H PRN, Neysa Apley, PA-C    OLANZapine (ZyPREXA) tablet 20 mg, 20 mg, Oral, HS, Neysa Apley, PA-C, 20 mg at 08/06/18 2144    polyethylene glycol (MIRALAX) packet 17 g, 17 g, Oral, BID PRN, Neysa Apley, PA-C      Objective:     Vital Signs:  Vitals:    08/06/18 0737 08/06/18 1700 08/07/18 0713 08/07/18 0714   BP: 110/75 117/89 121/68 118/79   BP Location: Left arm  Left arm Left arm   Pulse: 66  60 65   Resp:   16    Temp:   98 °F (36 7 °C)    TempSrc:   Temporal    SpO2:       Weight:       Height:             Appearance:  age appropriate and casually dressed   Behavior:  normal   Speech:  normal volume   Mood:  labile   Affect:  increased in intensity   Thought Process:  disorganized and flight of ideas   Thought Content:  delusions  grandiose and persecutory   Perceptual Disturbances: None   Risk Potential: none   Sensorium:  person, place and time   Cognition:  intact   Consciousness:  alert and awake    Attention: poor   Intellect: average   Insight:  poor   Judgment: poor      Motor Activity: no abnormal movements           Recent Labs:  Results Reviewed     Procedure Component Value Units Date/Time    Urine Microscopic [[id number]]  (Abnormal) Collected:  07/19/18 2007    Lab Status:  Final result Specimen:  Urine from Urine, Clean Catch Updated:  07/19/18 1857     RBC, UA 2-4 (A) /hpf      WBC, UA 4-10 (A) /hpf      Epithelial Cells Occasional /hpf Bacteria, UA None Seen /hpf     Rapid drug screen, urine [[id number]]  (Normal) Collected:  07/19/18 1759    Lab Status:  Final result Specimen:  Urine from Urine, Clean Catch Updated:  07/19/18 1839     Amph/Meth UR Negative     Barbiturate Ur Negative     Benzodiazepine Urine Negative     Cocaine Urine Negative     Methadone Urine Negative     Opiate Urine Negative     PCP Ur Negative     THC Urine Negative    Narrative:         FOR MEDICAL PURPOSES ONLY  IF CONFIRMATION NEEDED PLEASE CONTACT THE LAB WITHIN 5 DAYS  Drug Screen Cutoff Levels:  AMPHETAMINE/METHAMPHETAMINES  1000 ng/mL  BARBITURATES     200 ng/mL  BENZODIAZEPINES     200 ng/mL  COCAINE      300 ng/mL  METHADONE      300 ng/mL  OPIATES      300 ng/mL  PHENCYCLIDINE     25 ng/mL  THC       50 ng/mL    Ethanol [[id number]]  (Normal) Collected:  07/19/18 1757    Lab Status:  Final result Specimen:  Blood from Arm, Left Updated:  07/19/18 1826     Ethanol Lvl <10 mg/dL     Comprehensive metabolic panel [[id number]]  (Abnormal) Collected:  07/19/18 1757    Lab Status:  Final result Specimen:  Blood from Arm, Left Updated:  07/19/18 1826     Sodium 144 mmol/L      Potassium 3 5 (L) mmol/L      Chloride 106 mmol/L      CO2 28 mmol/L      Anion Gap 10 mmol/L      BUN 19 mg/dL      Creatinine 1 02 mg/dL      Glucose 117 (H) mg/dL      Calcium 9 6 mg/dL      AST 41 U/L      ALT 49 U/L      Alkaline Phosphatase 72 U/L      Total Protein 7 8 g/dL      Albumin 4 6 g/dL      Total Bilirubin 0 60 mg/dL      eGFR 86 ml/min/1 73sq m     Narrative:         National Kidney Disease Education Program recommendations are as follows:  GFR calculation is accurate only with a steady state creatinine  Chronic Kidney disease less than 60 ml/min/1 73 sq  meters  Kidney failure less than 15 ml/min/1 73 sq  meters      UA w Reflex to Microscopic [[id number]]  (Abnormal) Collected:  07/19/18 1759    Lab Status:  Final result Specimen:  Urine from Urine, Clean Catch Updated:  07/19/18 1823     Color, UA Yellow     Clarity, UA Clear     Specific Gravity, UA 1 025     pH, UA 5 0     Leukocytes, UA 25 0 (A)     Nitrite, UA Negative     Protein, UA 30 (1+) (A) mg/dl      Glucose, UA Negative mg/dl      Ketones, UA Negative mg/dl      Bilirubin, UA Negative     Blood, UA 10 0 (A)     UROBILINOGEN UA Negative mg/dL     CBC and differential [[id number]]  (Abnormal) Collected:  07/19/18 1757    Lab Status:  Final result Specimen:  Blood from Arm, Left Updated:  07/19/18 1811     WBC 7 20 Thousand/uL      RBC 4 35 (L) Million/uL      Hemoglobin 13 4 (L) g/dL      Hematocrit 39 8 (L) %      MCV 92 fL      MCH 30 9 pg      MCHC 33 8 g/dL      RDW 14 1 %      MPV 9 0 fL      Platelets 753 Thousands/uL      Neutrophils Relative 58 %      Lymphocytes Relative 32 %      Monocytes Relative 8 %      Eosinophils Relative 2 %      Basophils Relative 1 %      Neutrophils Absolute 4 20 Thousands/µL      Lymphocytes Absolute 2 30 Thousands/µL      Monocytes Absolute 0 60 Thousand/µL      Eosinophils Absolute 0 10 Thousand/µL      Basophils Absolute 0 10 Thousands/µL           I/O Past 24 hours:  No intake/output data recorded  No intake/output data recorded  Assessment / Plan:     Schizoaffective disorder, bipolar type ([email or web address] )    Recommended Treatment:      Medication changes: Add Zyprexa 10 mg in the morning    Non-pharmacological treatments  1) Continue with group therapy, milieu therapy and occupational therapy  Safety  1) Safety/communication plan established targeting dynamic risk factors above  2) Risks, benefits, and possible side effects of medications explained to patient and patient verbalizes understanding  Counseling / Coordination of Care    Total floor / unit time spent today 20 minutes  Greater than 50% of total time was spent with the patient and / or family counseling and / or coordination of care  A description of the counseling / coordination of care       Patient's Rights, confidentiality and exceptions to confidentiality, use of automated medical record, [facility] staff access to medical record, and consent to treatment reviewed      Nadir Madrid PA-C

## 2018-08-07 NOTE — PROGRESS NOTES
Pt engaged in group process  Problem solving process needs improvement as pt unable to focus clearly  Pt self disclose and identified coping skills however needed redirection to remain on topic  Pt spoke about movie and music themes  Pt did announce his name was "Jaret Madsen" although worker already has known pt since [facility] admission  This is an improvement in identification since earlier on admission pt was calling himself "Leanna Ambriz"  Continue to provide therapeutic group support

## 2018-08-07 NOTE — NURSING NOTE
VS taken for Norvasc  (98 2-70-20) pulse ox 100%,B/P 130/91  Ativan 1 mg po prn requested at 1705 for anxiety

## 2018-08-07 NOTE — PROGRESS NOTES
Client appeared agitated, stomping through the hallway  This nurse approached him and the client stated "That man owns this hospital and if he thinks he can keep me here, I'll kick his ass " Client then began to state how unethical and "against god," it is to keep him here  He states the hospital will have to "answer for what they are doing " Client declined a PRN at this time  Declined to be assessed  Stated he had people to call and went into his room  Client is currently in his bed  He will continue to be assessed

## 2018-08-07 NOTE — PLAN OF CARE
Problem: DISCHARGE PLANNING  Goal: Discharge to home or other facility with appropriate resources  INTERVENTIONS:  - Identify barriers to discharge w/patient and caregiver  - Arrange for needed discharge resources and transportation as appropriate  - Identify discharge learning needs (meds, wound care, etc )  - Arrange for interpretive services to assist at discharge as needed  -Patient will return home  - will arrange outpatient psychiatric services  - Refer to Case Management Department for coordinating discharge planning if the patient needs post-hospital services based on physician/advanced practitioner order or complex needs related to functional status, cognitive ability, or social support system    Outcome: Not Progressing  Patient has been medication compliant for the last few days  He continues to have ongoing grandiose and bizarre delusions  Patient's 304 hearing was held this morning, upheld to 90 days

## 2018-08-08 RX ADMIN — OLANZAPINE 20 MG: 10 TABLET, FILM COATED ORAL at 21:16

## 2018-08-08 RX ADMIN — NICOTINE POLACRILEX 2 MG: 2 GUM, CHEWING ORAL at 08:42

## 2018-08-08 RX ADMIN — BENZTROPINE MESYLATE 0.5 MG: 0.5 TABLET ORAL at 20:06

## 2018-08-08 RX ADMIN — LORAZEPAM 1 MG: 1 TABLET ORAL at 20:06

## 2018-08-08 RX ADMIN — HALOPERIDOL 10 MG: 5 TABLET ORAL at 15:33

## 2018-08-08 RX ADMIN — NICOTINE POLACRILEX 2 MG: 2 GUM, CHEWING ORAL at 15:33

## 2018-08-08 RX ADMIN — NICOTINE POLACRILEX 2 MG: 2 GUM, CHEWING ORAL at 20:06

## 2018-08-08 RX ADMIN — HALOPERIDOL 10 MG: 5 TABLET ORAL at 20:06

## 2018-08-08 RX ADMIN — OLANZAPINE 10 MG: 10 TABLET, FILM COATED ORAL at 08:06

## 2018-08-08 RX ADMIN — BENZTROPINE MESYLATE 0.5 MG: 0.5 TABLET ORAL at 15:33

## 2018-08-08 RX ADMIN — BENZTROPINE MESYLATE 0.5 MG: 0.5 TABLET ORAL at 08:06

## 2018-08-08 RX ADMIN — HALOPERIDOL 10 MG: 5 TABLET ORAL at 08:06

## 2018-08-08 NOTE — PLAN OF CARE
Problem: Alteration in Thoughts and Perception  Goal: Verbalize thoughts and feelings  Interventions:  - Promote a nonjudgmental and trusting relationship with the patient through active listening and therapeutic communication  - Assess patient's level of functioning, behavior and potential for risk  - Engage patient in 1 on 1 interactions for a minimum of 15 minutes each session  - Encourage patient to express fears, feelings, frustrations, and discuss symptoms    - Sarah patient to reality, help patient recognize reality-based thinking   - Administer medications as ordered and assess for potential side effects  - Provide the patient education related to the signs and symptoms of the illness and desired effects of prescribed medications   Outcome: Progressing      Problem: Ineffective Coping  Goal: Identifies ineffective coping skills  Outcome: Progressing  Patient has been verbalizing his needs to staff, he has not had any outburst today  Goal: Participates in unit activities  Interventions:  - Provide therapeutic environment   - Provide required programming   - Redirect inappropriate behaviors    Outcome: Progressing  Attend select groups    Problem: Risk for Self Injury/Neglect  Goal: Verbalize thoughts and feelings  Interventions:  - Assess and re-assess patient's lethality and potential for self-injury  - Engage patient in 1:1 interactions, daily, for a minimum of 15 minutes  - Encourage patient to express feelings, fears, frustrations, hopes  - Establish rapport/trust with patient    Outcome: Progressing  Patient does not readily share his feelings  He does approach staff for his needs       Problem: Depression  Goal: Verbalize thoughts and feelings  Interventions:  - Assess and re-assess patient's level of risk   - Engage patient in 1:1 interactions, daily, for a minimum of 15 minutes   - Encourage patient to express feelings, fears, frustrations, hopes    Outcome: Completed Date Met: 97/48/50  Duplicate intervention    Problem: Risk for Violence/Aggression Toward Others  Goal: Verbalize thoughts and feelings  Interventions:  - Assess and re-assess patient's level of risk, every waking shift  - Engage patient in 1:1 interactions, daily, for a minimum of 15 minutes   - Allow patient to express feelings and frustrations in a safe and non-threatening manner   - Establish rapport/trust with patient    Outcome: Completed Date Met: 87/94/27  Duplicate intervention    Problem: Alteration in Orientation  Goal: Attend and participate in unit activities, including therapeutic, recreational, and educational groups  Interventions:  - Provide therapeutic and educational activities daily, encourage attendance and participation, and document same in the medical record   - Provide appropriate opportunities for reminiscence   - Provide a consistent daily routine   - Encourage family contact/visitation    Outcome: Progressing  He attends select groups and is more visible on the unit    Comments: See individual notes

## 2018-08-08 NOTE — PROGRESS NOTES
Patient has been visible all day pleasant and cooperative  He has limited interactions or engagement with his peers and seems to be on the periphery of activities  He continues to be focused on his discharge date  He has not exhibited any behavioral issues today  He has been med/meal compliant  Staff will continue to monitor for safety and well being  No reports of SI/HI
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
## 2018-08-08 NOTE — PLAN OF CARE
Alteration in Orientation     Attend and participate in unit activities, including therapeutic, recreational, and educational groups Progressing        Alteration in Thoughts and Perception     Verbalize thoughts and feelings Progressing        ANXIETY     Will report anxiety at manageable levels Progressing        Depression     Verbalize thoughts and feelings [address] Discharge to home or other facility with appropriate resources Progressing        Ineffective Coping     Identifies ineffective coping skills Progressing     Participates in unit activities Progressing        Risk for Self Injury/Neglect     Verbalize thoughts and feelings Progressing        Risk for Violence/Aggression Toward Others     Verbalize thoughts and feelings Progressing        SELF CARE DEFICIT     Return ADL status to a safe level of function Progressing

## 2018-08-08 NOTE — PLAN OF CARE
Problem: DISCHARGE PLANNING  Goal: Discharge to home or other facility with appropriate resources  INTERVENTIONS:  - Identify barriers to discharge w/patient and caregiver  - Arrange for needed discharge resources and transportation as appropriate  - Identify discharge learning needs (meds, wound care, etc )  - Arrange for interpretive services to assist at discharge as needed  -Patient will return home  - will arrange outpatient psychiatric services  - Refer to Case Management Department for coordinating discharge planning if the patient needs post-hospital services based on physician/advanced practitioner order or complex needs related to functional status, cognitive ability, or social support system    Outcome: Progressing  Patient is compliant with his medications  He is agreeable to receive a long acting injection at this time

## 2018-08-08 NOTE — NURSING NOTE
Received patient at 1900  Patient was withdrawn to room during the evening and appeared to go to sleep early  Patient awakened for medications and was pleasant on approach and complaint with all medications  Patient has appeared to sleep through the night so far  Will continue to monitor closely on assault and suicide precautions  Primitivo [place]

## 2018-08-08 NOTE — SOCIAL WORK
Patient requested to speak with worker regarding a discharge date  Worker informed him that he has no discharge date at this time  Patient informed worker that he is interested in receiving a monthly injection and going to outpatient  Worker informed him to speak with the psychiatrist regarding such  Worker reported once a discharge date is determined, outpatient appointments can be arranged

## 2018-08-09 RX ADMIN — HALOPERIDOL 10 MG: 5 TABLET ORAL at 16:01

## 2018-08-09 RX ADMIN — HYDROXYZINE HYDROCHLORIDE 100 MG: 50 TABLET, FILM COATED ORAL at 17:36

## 2018-08-09 RX ADMIN — NICOTINE POLACRILEX 2 MG: 2 GUM, CHEWING ORAL at 12:27

## 2018-08-09 RX ADMIN — BENZTROPINE MESYLATE 0.5 MG: 0.5 TABLET ORAL at 16:01

## 2018-08-09 RX ADMIN — BENZTROPINE MESYLATE 0.5 MG: 0.5 TABLET ORAL at 21:06

## 2018-08-09 RX ADMIN — NICOTINE POLACRILEX 2 MG: 2 GUM, CHEWING ORAL at 08:13

## 2018-08-09 RX ADMIN — HALOPERIDOL 10 MG: 5 TABLET ORAL at 08:12

## 2018-08-09 RX ADMIN — NICOTINE POLACRILEX 2 MG: 2 GUM, CHEWING ORAL at 16:02

## 2018-08-09 RX ADMIN — OLANZAPINE 10 MG: 10 TABLET, FILM COATED ORAL at 08:12

## 2018-08-09 RX ADMIN — HYDROXYZINE HYDROCHLORIDE 100 MG: 50 TABLET, FILM COATED ORAL at 11:02

## 2018-08-09 RX ADMIN — HALOPERIDOL 10 MG: 5 TABLET ORAL at 21:05

## 2018-08-09 RX ADMIN — BENZTROPINE MESYLATE 0.5 MG: 0.5 TABLET ORAL at 08:11

## 2018-08-09 RX ADMIN — OLANZAPINE 20 MG: 10 TABLET, FILM COATED ORAL at 21:05

## 2018-08-09 NOTE — PLAN OF CARE
Problem: Risk for Self Injury/Neglect  Goal: Verbalize thoughts and feelings  Interventions:  - Assess and re-assess patient's lethality and potential for self-injury  - Engage patient in 1:1 interactions, daily, for a minimum of 15 minutes  - Encourage patient to express feelings, fears, frustrations, hopes  - Establish rapport/trust with patient    Outcome: Progressing      Comments: Patient denies symptoms and denies feeling depressed  Patient is both calm and cooperative on unit with no complaints of pain at this time

## 2018-08-09 NOTE — PROGRESS NOTES
Patient asked for his as needed Atarax, he is anxious due to not sure when he is being discharged  Despite this request, patient is overall calmer, more cooperative and engages in conversations that are coherent   I praised patient for his improvement since I last saw him and he stated, "I think my meds are right and I do feel better "

## 2018-08-09 NOTE — NURSING NOTE
Received patient at 1900  Patient was cooperative and visible in the milieu during the evening but did not appear to engage with peers  Patient pleasant on approach but appeared to be anxious and internally occupied  Patient reported 3/4 anxiety  Patient denied depression, SI/HI, AH/VH, and pain  Patient administered PRN Ativan 1 mg at 2006  Patient was compliant with all medications and evening snack  Patient has appeared to be sleeping for the past several hours  Will continue to monitor closely on assault and suicide precautions

## 2018-08-09 NOTE — PROGRESS NOTES
Psychiatry Progress Note    Subjective: Interval History     Patient less agitated during assessment today  Patient read does continue to have tangential thoughts  Patient speaking about the tasneem that been placed in his arm years ago and that it is close to coming out of the skin in as a result he has to utilize exercising to keep the muscles strong  Patient is expressing that he would like to go to the [address] [place] [address] for outpatient treatment once he is discharged  Patient also still considering going to the [address] again as he still reports that he has been mixing extract, vinegar and water to make alcoholic beverages  Patient still lacking insight into his symptoms and treatment as he continues to want a discharge date  Patient denying all psychiatric symptoms  Patient does report he is tolerating his medications well with no adverse effects      Current medications:    Current Facility-Administered Medications:     acetaminophen (TYLENOL) tablet 650 mg, 650 mg, Oral, Q4H PRN, Humera Ford MD    benztropine (COGENTIN) tablet 0 5 mg, 0 5 mg, Oral, TID, Carmen Whitaker PA-C, 0 5 mg at 08/09/18 0355    benztropine (COGENTIN) tablet 1 mg, 1 mg, Oral, Q4H PRN, DAVID Sorensen-ROSA, 1 mg at 07/28/18 1952    diphenhydrAMINE (BENADRYL) injection 50 mg, 50 mg, Intramuscular, Q4H PRN, Carmen Whitaker PA-C    docusate sodium (COLACE) capsule 100 mg, 100 mg, Oral, Q6H PRN, Carmen Whitaker PA-C    haloperidol (HALDOL) tablet 10 mg, 10 mg, Oral, Q4H PRN, DAVID Sorensen-ROSA, 10 mg at 07/28/18 1953    haloperidol (HALDOL) tablet 10 mg, 10 mg, Oral, TID, DAVID Sorensen-ROSA, 10 mg at 08/09/18 8337    haloperidol lactate (HALDOL) injection 10 mg, 10 mg, Intramuscular, Q4H PRN, Carmen Whitaker PA-C    hydrOXYzine HCL (ATARAX) tablet 100 mg, 100 mg, Oral, Q6H PRN, Carmen Whitaker PA-C    ibuprofen (MOTRIN) tablet 400 mg, 400 mg, Oral, Q6H PRN, Carmen Whitaker PA-C   loperamide (IMODIUM) capsule 2 mg, 2 mg, Oral, Q4H PRN, Maria T [place], PA-C, 2 mg at 08/04/18 0836    LORazepam (ATIVAN) 2 mg/mL injection 2 mg, 2 mg, Intramuscular, Q6H PRN, Maria T [place], PA-C    LORazepam (ATIVAN) tablet 1 mg, 1 mg, Oral, Q4H PRN, Maria T [place], PA-C, 1 mg at 08/08/18 2006    melatonin tablet 9 mg, 9 mg, Oral, HS PRN, Maria T [place], PA-C    nicotine polacrilex (NICORETTE) gum 2 mg, 2 mg, Oral, Q4H PRN, Maria T [place], PA-C, 2 mg at 08/09/18 0813    OLANZapine (ZyPREXA) tablet 10 mg, 10 mg, Oral, Daily, Maria T [place], PA-C, 10 mg at 08/09/18 0291    OLANZapine (ZyPREXA) tablet 20 mg, 20 mg, Oral, HS, Maria T [place], PA-C, 20 mg at 08/08/18 2116    polyethylene glycol (MIRALAX) packet 17 g, 17 g, Oral, BID PRN, Maria T [place], PA-C      Objective:     Vital Signs:  Vitals:    08/07/18 0714 08/07/18 1640 08/08/18 0819 08/08/18 0820   BP: 118/79 130/91 102/72 100/67   BP Location: Left arm Left arm Left arm Left arm   Pulse: 65 71 64 87   Resp:  18 16    Temp:  98 2 °F (36 8 °C) (!) 97 °F (36 1 °C)    TempSrc:  Temporal Temporal    SpO2:  100% 99%    Weight:       Height:             Appearance:  age appropriate and casually dressed   Behavior:  normal   Speech:  normal volume   Mood:  labile   Affect:  increased in intensity   Thought Process:  disorganized and flight of ideas   Thought Content:  delusions  grandiose and persecutory   Perceptual Disturbances: None   Risk Potential: none   Sensorium:  person, place and time   Cognition:  intact   Consciousness:  alert and awake    Attention: poor   Intellect: average   Insight:  poor   Judgment: poor      Motor Activity: no abnormal movements           Recent Labs:  Results Reviewed     Procedure Component Value Units Date/Time    Urine Microscopic [[id number]]  (Abnormal) Collected:  07/19/18 2469    Lab Status:  Final result Specimen:  Urine from Urine, Clean Catch Updated:  07/19/18 1857     RBC, UA 2-4 (A) /hpf WBC, UA 4-10 (A) /hpf      Epithelial Cells Occasional /hpf      Bacteria, UA None Seen /hpf     Rapid drug screen, urine [[id number]]  (Normal) Collected:  07/19/18 1759    Lab Status:  Final result Specimen:  Urine from Urine, Clean Catch Updated:  07/19/18 1839     Amph/Meth UR Negative     Barbiturate Ur Negative     Benzodiazepine Urine Negative     Cocaine Urine Negative     Methadone Urine Negative     Opiate Urine Negative     PCP Ur Negative     THC Urine Negative    Narrative:         FOR MEDICAL PURPOSES ONLY  IF CONFIRMATION NEEDED PLEASE CONTACT THE LAB WITHIN 5 DAYS  Drug Screen Cutoff Levels:  AMPHETAMINE/METHAMPHETAMINES  1000 ng/mL  BARBITURATES     200 ng/mL  BENZODIAZEPINES     200 ng/mL  COCAINE      300 ng/mL  METHADONE      300 ng/mL  OPIATES      300 ng/mL  PHENCYCLIDINE     25 ng/mL  THC       50 ng/mL    Ethanol [[id number]]  (Normal) Collected:  07/19/18 1757    Lab Status:  Final result Specimen:  Blood from Arm, Left Updated:  07/19/18 1826     Ethanol Lvl <10 mg/dL     Comprehensive metabolic panel [[id number]]  (Abnormal) Collected:  07/19/18 1757    Lab Status:  Final result Specimen:  Blood from Arm, Left Updated:  07/19/18 1826     Sodium 144 mmol/L      Potassium 3 5 (L) mmol/L      Chloride 106 mmol/L      CO2 28 mmol/L      Anion Gap 10 mmol/L      BUN 19 mg/dL      Creatinine 1 02 mg/dL      Glucose 117 (H) mg/dL      Calcium 9 6 mg/dL      AST 41 U/L      ALT 49 U/L      Alkaline Phosphatase 72 U/L      Total Protein 7 8 g/dL      Albumin 4 6 g/dL      Total Bilirubin 0 60 mg/dL      eGFR 86 ml/min/1 73sq m     Narrative:         National Kidney Disease Education Program recommendations are as follows:  GFR calculation is accurate only with a steady state creatinine  Chronic Kidney disease less than 60 ml/min/1 73 sq  meters  Kidney failure less than 15 ml/min/1 73 sq  meters      UA w Reflex to Microscopic [[id number]]  (Abnormal) Collected:  07/19/18 1759    Lab Status:  Final result Specimen:  Urine from Urine, Clean Catch Updated:  07/19/18 1823     Color, UA Yellow     Clarity, UA Clear     Specific Gravity, UA 1 025     pH, UA 5 0     Leukocytes, UA 25 0 (A)     Nitrite, UA Negative     Protein, UA 30 (1+) (A) mg/dl      Glucose, UA Negative mg/dl      Ketones, UA Negative mg/dl      Bilirubin, UA Negative     Blood, UA 10 0 (A)     UROBILINOGEN UA Negative mg/dL     CBC and differential [[id number]]  (Abnormal) Collected:  07/19/18 1757    Lab Status:  Final result Specimen:  Blood from Arm, Left Updated:  07/19/18 1811     WBC 7 20 Thousand/uL      RBC 4 35 (L) Million/uL      Hemoglobin 13 4 (L) g/dL      Hematocrit 39 8 (L) %      MCV 92 fL      MCH 30 9 pg      MCHC 33 8 g/dL      RDW 14 1 %      MPV 9 0 fL      Platelets 170 Thousands/uL      Neutrophils Relative 58 %      Lymphocytes Relative 32 %      Monocytes Relative 8 %      Eosinophils Relative 2 %      Basophils Relative 1 %      Neutrophils Absolute 4 20 Thousands/µL      Lymphocytes Absolute 2 30 Thousands/µL      Monocytes Absolute 0 60 Thousand/µL      Eosinophils Absolute 0 10 Thousand/µL      Basophils Absolute 0 10 Thousands/µL           I/O Past 24 hours:  No intake/output data recorded  No intake/output data recorded  Assessment / Plan:     [facility]ffective [facility]    Recommended Treatment:      Medication changes:    Continue to monitor on increased Zyprexa dosing  Non-pharmacological treatments  1) Continue with group therapy, milieu therapy and occupational therapy  Safety  1) Safety/communication plan established targeting dynamic risk factors above  2) Risks, benefits, and possible side effects of medications explained to patient and patient verbalizes understanding  Counseling / Coordination of Care    Total floor / unit time spent today 20 minutes  Greater than 50% of total time was spent with the patient and / or family counseling and / or coordination of care   A description of the counseling / coordination of care  Patient's Rights, confidentiality and exceptions to confidentiality, use of automated medical record, [facility] staff access to medical record, and consent to treatment reviewed      Scott Bell PA-C

## 2018-08-10 RX ORDER — PANTOPRAZOLE SODIUM 40 MG/1
40 TABLET, DELAYED RELEASE ORAL
Status: DISCONTINUED | OUTPATIENT
Start: 2018-08-10 | End: 2018-08-16 | Stop reason: HOSPADM

## 2018-08-10 RX ADMIN — NICOTINE POLACRILEX 2 MG: 2 GUM, CHEWING ORAL at 08:04

## 2018-08-10 RX ADMIN — BENZTROPINE MESYLATE 0.5 MG: 0.5 TABLET ORAL at 16:57

## 2018-08-10 RX ADMIN — OLANZAPINE 10 MG: 10 TABLET, FILM COATED ORAL at 08:05

## 2018-08-10 RX ADMIN — HALOPERIDOL 10 MG: 5 TABLET ORAL at 21:24

## 2018-08-10 RX ADMIN — HALOPERIDOL 10 MG: 5 TABLET ORAL at 16:58

## 2018-08-10 RX ADMIN — HYDROXYZINE HYDROCHLORIDE 100 MG: 50 TABLET, FILM COATED ORAL at 08:48

## 2018-08-10 RX ADMIN — BENZTROPINE MESYLATE 0.5 MG: 0.5 TABLET ORAL at 08:05

## 2018-08-10 RX ADMIN — OLANZAPINE 20 MG: 10 TABLET, FILM COATED ORAL at 21:24

## 2018-08-10 RX ADMIN — PANTOPRAZOLE SODIUM 40 MG: 40 TABLET, DELAYED RELEASE ORAL at 16:58

## 2018-08-10 RX ADMIN — NICOTINE POLACRILEX 2 MG: 2 GUM, CHEWING ORAL at 17:21

## 2018-08-10 RX ADMIN — HALOPERIDOL 10 MG: 5 TABLET ORAL at 08:05

## 2018-08-10 RX ADMIN — BENZTROPINE MESYLATE 0.5 MG: 0.5 TABLET ORAL at 21:24

## 2018-08-10 NOTE — PROGRESS NOTES
Patient reported to nurse he had just thrown up  Inspection of room reveals a LARGE amount of vomitus on the floor of his room  It appears to be partially digested food and fluids  Patient reported he did not feel nauseated prior to event  He did not feel any increase in anxiety  Per staff, patient tends to overeat and eats quickly at meals  He not only eats his own but received food from others  Ativan was offered but declined at this time  Will continue to monitor

## 2018-08-10 NOTE — PROGRESS NOTES
Patient presented pleasant and cooperative today  He reports some anxiety which is causing him GERD  He denies depression, denies SI/HI or hallucinations  Patient is to be re-started on GERD medicine today  He has been visible on the unit, but he still does not interact or engage with his peers  He speaks freely and engages with staff members  He is med/meal compliant  He attends to his ADL needs daily  Staff will continue to monitor for safety and well being

## 2018-08-10 NOTE — PROGRESS NOTES
Psychiatry Progress Note    Subjective: Interval History     Patient less fixated on discharge during assessment  Patient today reporting that he feels it is his fault that he is in the hospital   Patient however still lacking insight into that it is his psychiatric diagnosis and lack of compliance with treatment that has led to his admission  Patient today expressing that he feels that and no longer going to [address] and spending time around the wrong people are why he ended up in the hospital   Patient expressing that he would like to be able to return back to working  Patient reporting that he did not want anyone to know he had left his job without notice  Patient reports that he had not been able to eat for several days and then had taken money of the cash register to get food  Patient reports that the guilt was too much for him and as a result he quit unexpectedly  Patient reports that he spoke with the manager who said he would have to be interviewed by a  to consider rehiring him  Patient continues to report that he did not tell them why he had quit  Patient has been compliant with his medications  Patient intermittently visible on the unit throughout the day  Patient slept well last evening  Patient reporting that his gastric reflux has worsened  Patient previously been on Protonix however it was discontinued due to patient's refusal at the beginning of his hospitalization  We will re-initiate Protonix      Current medications:    Current Facility-Administered Medications:     acetaminophen (TYLENOL) tablet 650 mg, 650 mg, Oral, Q4H PRN, Mario Proctor MD    benztropine (COGENTIN) tablet 0 5 mg, 0 5 mg, Oral, TID, Felipe Lawrence PA-C, 0 5 mg at 08/10/18 0805    benztropine (COGENTIN) tablet 1 mg, 1 mg, Oral, Q4H PRN, Felipe Lawrence PA-C, 1 mg at 07/28/18 1952    diphenhydrAMINE (BENADRYL) injection 50 mg, 50 mg, Intramuscular, Q4H PRN, Felipe Lawrence PA-C   docusate sodium (COLACE) capsule 100 mg, 100 mg, Oral, Q6H PRN, Marshall Hardy PA-C    haloperidol (HALDOL) tablet 10 mg, 10 mg, Oral, Q4H PRN, [place], PA-C, 10 mg at 07/28/18 1953    haloperidol (HALDOL) tablet 10 mg, 10 mg, Oral, TID, [place] Antony, PA-C, 10 mg at 08/10/18 0805    haloperidol lactate (HALDOL) injection 10 mg, 10 mg, Intramuscular, Q4H PRN, [place] Antony, PA-C    hydrOXYzine HCL (ATARAX) tablet 100 mg, 100 mg, Oral, Q6H PRN, Marshall Antony, PA-C, 100 mg at 08/10/18 0848    ibuprofen (MOTRIN) tablet 400 mg, 400 mg, Oral, Q6H PRN, Marshall Hardy PA-C    loperamide (IMODIUM) capsule 2 mg, 2 mg, Oral, Q4H PRN, Marshall Hardy, PA-C, 2 mg at 08/04/18 0836    LORazepam (ATIVAN) 2 mg/mL injection 2 mg, 2 mg, Intramuscular, Q6H PRN, Marshall Hardy PA-C    LORazepam (ATIVAN) tablet 1 mg, 1 mg, Oral, Q4H PRN, Marshall Antony, PA-C, 1 mg at 08/08/18 2006    melatonin tablet 9 mg, 9 mg, Oral, HS PRN, Marshall Hardy PA-C    nicotine polacrilex (NICORETTE) gum 2 mg, 2 mg, Oral, Q4H PRN, Marshall Antony, PA-C, 2 mg at 08/10/18 0804    OLANZapine (ZyPREXA) tablet 10 mg, 10 mg, Oral, Daily, [place] Antony, PA-C, 10 mg at 08/10/18 0805    OLANZapine (ZyPREXA) tablet 20 mg, 20 mg, Oral, HS, [place] Antony, PA-C, 20 mg at 08/09/18 2105    polyethylene glycol (MIRALAX) packet 17 g, 17 g, Oral, BID PRN, Marshall Hardy PA-C      Objective:     Vital Signs:  Vitals:    08/08/18 0820 08/09/18 0816 08/10/18 0747 08/10/18 0749   BP: 100/67 123/58 117/67 115/76   BP Location: Left arm Left arm Left arm Left arm   Pulse: 87 60 80 86   Resp:  18 16    Temp:  98 1 °F (36 7 °C) 98 2 °F (36 8 °C)    TempSrc:  Temporal Temporal    SpO2:       Weight:       Height:             Appearance:  age appropriate and casually dressed   Behavior:  normal   Speech:  normal volume   Mood:  labile   Affect:  increased in intensity   Thought Process:  disorganized and flight of ideas   Thought Content:  delusions  grandiose and persecutory   Perceptual Disturbances: None   Risk Potential: none   Sensorium:  person, place and time   Cognition:  intact   Consciousness:  alert and awake    Attention: poor   Intellect: average   Insight:  poor   Judgment: poor      Motor Activity: no abnormal movements           Recent Labs:  Results Reviewed     Procedure Component Value Units Date/Time    Urine Microscopic [[id number]]  (Abnormal) Collected:  07/19/18 1759    Lab Status:  Final result Specimen:  Urine from Urine, Clean Catch Updated:  07/19/18 1857     RBC, UA 2-4 (A) /hpf      WBC, UA 4-10 (A) /hpf      Epithelial Cells Occasional /hpf      Bacteria, UA None Seen /hpf     Rapid drug screen, urine [[id number]]  (Normal) Collected:  07/19/18 1759    Lab Status:  Final result Specimen:  Urine from Urine, Clean Catch Updated:  07/19/18 1839     Amph/Meth UR Negative     Barbiturate Ur Negative     Benzodiazepine Urine Negative     Cocaine Urine Negative     Methadone Urine Negative     Opiate Urine Negative     PCP Ur Negative     THC Urine Negative    Narrative:         FOR MEDICAL PURPOSES ONLY  IF CONFIRMATION NEEDED PLEASE CONTACT THE LAB WITHIN 5 DAYS      Drug Screen Cutoff Levels:  AMPHETAMINE/METHAMPHETAMINES  1000 ng/mL  BARBITURATES     200 ng/mL  BENZODIAZEPINES     200 ng/mL  COCAINE      300 ng/mL  METHADONE      300 ng/mL  OPIATES      300 ng/mL  PHENCYCLIDINE     25 ng/mL  THC       50 ng/mL    Ethanol [[id number]]  (Normal) Collected:  07/19/18 1757    Lab Status:  Final result Specimen:  Blood from Arm, Left Updated:  07/19/18 1826     Ethanol Lvl <10 mg/dL     Comprehensive metabolic panel [[id number]]  (Abnormal) Collected:  07/19/18 1757    Lab Status:  Final result Specimen:  Blood from Arm, Left Updated:  07/19/18 1826     Sodium 144 mmol/L      Potassium 3 5 (L) mmol/L      Chloride 106 mmol/L      CO2 28 mmol/L      Anion Gap 10 mmol/L      BUN 19 mg/dL      Creatinine 1 02 mg/dL      Glucose 117 (H) mg/dL      Calcium 9 6 mg/dL      AST 41 U/L      ALT 49 U/L      Alkaline Phosphatase 72 U/L      Total Protein 7 8 g/dL      Albumin 4 6 g/dL      Total Bilirubin 0 60 mg/dL      eGFR 86 ml/min/1 73sq m     Narrative:         National Kidney Disease Education Program recommendations are as follows:  GFR calculation is accurate only with a steady state creatinine  Chronic Kidney disease less than 60 ml/min/1 73 sq  meters  Kidney failure less than 15 ml/min/1 73 sq  meters  UA w Reflex to Microscopic [[id number]]  (Abnormal) Collected:  07/19/18 1759    Lab Status:  Final result Specimen:  Urine from Urine, Clean Catch Updated:  07/19/18 1823     Color, UA Yellow     Clarity, UA Clear     Specific Gravity, UA 1 025     pH, UA 5 0     Leukocytes, UA 25 0 (A)     Nitrite, UA Negative     Protein, UA 30 (1+) (A) mg/dl      Glucose, UA Negative mg/dl      Ketones, UA Negative mg/dl      Bilirubin, UA Negative     Blood, UA 10 0 (A)     UROBILINOGEN UA Negative mg/dL     CBC and differential [[id number]]  (Abnormal) Collected:  07/19/18 1757    Lab Status:  Final result Specimen:  Blood from Arm, Left Updated:  07/19/18 1811     WBC 7 20 Thousand/uL      RBC 4 35 (L) Million/uL      Hemoglobin 13 4 (L) g/dL      Hematocrit 39 8 (L) %      MCV 92 fL      MCH 30 9 pg      MCHC 33 8 g/dL      RDW 14 1 %      MPV 9 0 fL      Platelets 851 Thousands/uL      Neutrophils Relative 58 %      Lymphocytes Relative 32 %      Monocytes Relative 8 %      Eosinophils Relative 2 %      Basophils Relative 1 %      Neutrophils Absolute 4 20 Thousands/µL      Lymphocytes Absolute 2 30 Thousands/µL      Monocytes Absolute 0 60 Thousand/µL      Eosinophils Absolute 0 10 Thousand/µL      Basophils Absolute 0 10 Thousands/µL           I/O Past 24 hours:  No intake/output data recorded  No intake/output data recorded          Assessment / Plan:     Schizoaffective disorder, bipolar [facility] ([facility]    Recommended Treatment: Medication changes:  Initiate Protonix  Non-pharmacological treatments  1) Continue with group therapy, milieu therapy and occupational therapy  Safety  1) Safety/communication plan established targeting dynamic risk factors above  2) Risks, benefits, and possible side effects of medications explained to patient and patient verbalizes understanding  Counseling / Coordination of Care    Total floor / unit time spent today 20 minutes  Greater than 50% of total time was spent with the patient and / or family counseling and / or coordination of care  A description of the counseling / coordination of care  Patient's Rights, confidentiality and exceptions to confidentiality, use of automated medical record, [facility] staff access to medical record, and consent to treatment reviewed      Alyson Jarrett PA-C

## 2018-08-10 NOTE — NURSING NOTE
Received patient at 1900  Patient was cooperative during the evening and pleasant on approach  Patient was visible in milieu early in the evening but did not appear to engage much with peers  Patient presented with a blunted affect and appeared anxious  Patient presented for his medications early and went to bed early at ~2100  Patient has appeared to be sleeping for the past few hours  Will continue to monitor closely on assault and suicide precautions

## 2018-08-10 NOTE — NURSING NOTE
Patient has appeared to sleep through the night without interruption  Will continue to monitor closely on assault and suicide precautions

## 2018-08-10 NOTE — PLAN OF CARE
Problem: Alteration in Thoughts and Perception  Goal: Verbalize thoughts and feelings  Interventions:  - Promote a nonjudgmental and trusting relationship with the patient through active listening and therapeutic communication  - Assess patient's level of functioning, behavior and potential for risk  - Engage patient in 1 on 1 interactions for a minimum of 15 minutes each session  - Encourage patient to express fears, feelings, frustrations, and discuss symptoms    - [place] patient to reality, help patient recognize reality-based thinking   - Administer medications as ordered and assess for potential side effects  - Provide the patient education related to the signs and symptoms of the illness and desired effects of prescribed medications   Outcome: Progressing      Problem: Ineffective Coping  Goal: Identifies ineffective coping skills  Outcome: Progressing    Goal: Participates in unit activities  Interventions:  - Provide therapeutic environment   - Provide required programming   - Redirect inappropriate behaviors    Outcome: Not Progressing      Problem: Risk for Self Injury/Neglect  Goal: Verbalize thoughts and feelings  Interventions:  - Assess and re-assess patient's lethality and potential for self-injury  - Engage patient in 1:1 interactions, daily, for a minimum of 15 minutes  - Encourage patient to express feelings, fears, frustrations, hopes  - Establish rapport/trust with patient    Outcome: Not Progressing      Problem: Alteration in Orientation  Goal: Attend and participate in unit activities, including therapeutic, recreational, and educational groups  Interventions:  - Provide therapeutic and educational activities daily, encourage attendance and participation, and document same in the medical record   - Provide appropriate opportunities for reminiscence   - Provide a consistent daily routine   - Encourage family contact/visitation    Outcome: Not Progressing      Comments: Patient is med compliant, he continues to have difficulty expressing his feelings and fears  He still does not participate or interact in any group activity  He does attend art therapy sessions  He denies self harm ideations  He does complete ADL's daily  Staff will continue to work with patient to establish relationship in which he feels comfortable in expressing himself  Staff will encourage patient to interact with peers and attend groups

## 2018-08-10 NOTE — PLAN OF CARE
Problem: Alteration in Thoughts and Perception  Goal: Verbalize thoughts and feelings  Interventions:  - Promote a nonjudgmental and trusting relationship with the patient through active listening and therapeutic communication  - Assess patient's level of functioning, behavior and potential for risk  - Engage patient in 1 on 1 interactions for a minimum of 15 minutes each session  - Encourage patient to express fears, feelings, frustrations, and discuss symptoms    - [place] patient to reality, help patient recognize reality-based thinking   - Administer medications as ordered and assess for potential side effects  - Provide the patient education related to the signs and symptoms of the illness and desired effects of prescribed medications   Outcome: Progressing      Problem: Ineffective Coping  Goal: Identifies ineffective coping skills  Outcome: Progressing    Goal: Participates in unit activities  Interventions:  - Provide therapeutic environment   - Provide required programming   - Redirect inappropriate behaviors    Outcome: Progressing      Problem: Risk for Self Injury/Neglect  Goal: Verbalize thoughts and feelings  Interventions:  - Assess and re-assess patient's lethality and potential for self-injury  - Engage patient in 1:1 interactions, daily, for a minimum of 15 minutes  - Encourage patient to express feelings, fears, frustrations, hopes  - Establish rapport/trust with patient    Outcome: Progressing      Problem: Alteration in Orientation  Goal: Attend and participate in unit activities, including therapeutic, recreational, and educational groups  Interventions:  - Provide therapeutic and educational activities daily, encourage attendance and participation, and document same in the medical record   - Provide appropriate opportunities for reminiscence   - Provide a consistent daily routine   - Encourage family contact/visitation    Outcome: Progressing      Comments: Patient is engaging more positively with staff and expressing needs and emotions  Patient expressing delusional thought content at times  Patient has been compliant with all medications

## 2018-08-11 RX ADMIN — PANTOPRAZOLE SODIUM 40 MG: 40 TABLET, DELAYED RELEASE ORAL at 07:01

## 2018-08-11 RX ADMIN — HALOPERIDOL 10 MG: 5 TABLET ORAL at 08:46

## 2018-08-11 RX ADMIN — HALOPERIDOL 10 MG: 5 TABLET ORAL at 21:26

## 2018-08-11 RX ADMIN — OLANZAPINE 10 MG: 10 TABLET, FILM COATED ORAL at 08:46

## 2018-08-11 RX ADMIN — HALOPERIDOL 10 MG: 5 TABLET ORAL at 16:16

## 2018-08-11 RX ADMIN — NICOTINE POLACRILEX 2 MG: 2 GUM, CHEWING ORAL at 17:22

## 2018-08-11 RX ADMIN — BENZTROPINE MESYLATE 0.5 MG: 0.5 TABLET ORAL at 08:45

## 2018-08-11 RX ADMIN — BENZTROPINE MESYLATE 0.5 MG: 0.5 TABLET ORAL at 16:16

## 2018-08-11 RX ADMIN — OLANZAPINE 20 MG: 10 TABLET, FILM COATED ORAL at 21:26

## 2018-08-11 RX ADMIN — NICOTINE POLACRILEX 2 MG: 2 GUM, CHEWING ORAL at 08:49

## 2018-08-11 RX ADMIN — BENZTROPINE MESYLATE 0.5 MG: 0.5 TABLET ORAL at 21:26

## 2018-08-11 RX ADMIN — NICOTINE POLACRILEX 2 MG: 2 GUM, CHEWING ORAL at 12:41

## 2018-08-11 RX ADMIN — LORAZEPAM 1 MG: 1 TABLET ORAL at 19:18

## 2018-08-11 NOTE — PLAN OF CARE
Problem: Alteration in Thoughts and Perception  Goal: Verbalize thoughts and feelings  Interventions:  - Promote a nonjudgmental and trusting relationship with the patient through active listening and therapeutic communication  - Assess patient's level of functioning, behavior and potential for risk  - Engage patient in 1 on 1 interactions for a minimum of 15 minutes each session  - Encourage patient to express fears, feelings, frustrations, and discuss symptoms    - [place] patient to reality, help patient recognize reality-based thinking   - Administer medications as ordered and assess for potential side effects  - Provide the patient education related to the signs and symptoms of the illness and desired effects of prescribed medications   Outcome: Progressing      Problem: Ineffective Coping  Goal: Identifies ineffective coping skills  Outcome: Progressing    Goal: Participates in unit activities  Interventions:  - Provide therapeutic environment   - Provide required programming   - Redirect inappropriate behaviors    Outcome: Progressing      Problem: Risk for Self Injury/Neglect  Goal: Verbalize thoughts and feelings  Interventions:  - Assess and re-assess patient's lethality and potential for self-injury  - Engage patient in 1:1 interactions, daily, for a minimum of 15 minutes  - Encourage patient to express feelings, fears, frustrations, hopes  - Establish rapport/trust with patient    Outcome: Progressing      Problem: Alteration in Orientation  Goal: Attend and participate in unit activities, including therapeutic, recreational, and educational groups  Interventions:  - Provide therapeutic and educational activities daily, encourage attendance and participation, and document same in the medical record   - Provide appropriate opportunities for reminiscence   - Provide a consistent daily routine   - Encourage family contact/visitation    Outcome: Progressing      Comments: Patient visible and pleasant upon approach  Patient reports 1/4 anxiety and denies depression, S/HI,A/VH and pain at this time  Patient given nicorette gum as requested  Patient states he is feeling better since yesterday and has had no vomiting episodes since  Patient encouraged to come to staff if any episodes occur  Patient verbalized understanding  Patient stated he is looking forward to groups this  morning and did attend  Appears less irritable  Med and meal compliant  Will continue to monitor and provide therapeutic support

## 2018-08-11 NOTE — NURSING NOTE
Patient vomited x1 before shift began  During this assessment patient stated he is still nauseous and does not know what precipitated these symptoms  Patient refused medications and denies needing any assistance  Patient requested to continue sleeping and take his medications later when he is feeling better  Support given  Will continue to monitor

## 2018-08-11 NOTE — NURSING NOTE
Patient woke overnight and vomited x1  Patient refused medications  Support given  No further episodes noted  Will continue to monitor

## 2018-08-11 NOTE — PROGRESS NOTES
Psychiatry Progress Note    Subjective: Interval History     Patient is showing some improvement this morning  Patient reporting that he feels that he is adjusting well to his current medications  Patient himself brought up the discussion of a long-acting injectable  Patient asking if Ativan and Cogentin could also be dispensed and a long-acting  Patient educated on the short term nature of the injections of Ativan and Cogentin and that only Haldol comes in a long-acting  Patient reports that he would be willing to discuss this as a treatment option with the attending psychiatrist come Monday  Patient reports that he feels he has been doing well as he has not been having any depression or anxiety  Patient denies any auditory visual hallucinations  Patient not speaking about going on a mission trip to the [facility] dose today  Patient has been introducing himself by his appropriate 1st name to new individuals on the unit and not demanding to be called Sabina Aschoff  Patient states that he continued to have ongoing indigestion yesterday and did have 2 episodes of vomiting last evening  Patient reports that he has been moving his bowels  Patient with only report of mild flatulence today       Current medications:    Current Facility-Administered Medications:     acetaminophen (TYLENOL) tablet 650 mg, 650 mg, Oral, Q4H PRN, Salima Sarah MD    benztropine (COGENTIN) tablet 0 5 mg, 0 5 mg, Oral, TID, Ivonne Klinefelter, PA-C, 0 5 mg at 08/11/18 0845    benztropine (COGENTIN) tablet 1 mg, 1 mg, Oral, Q4H PRN, Ivonne Klinefelter, PA-C, 1 mg at 07/28/18 1952    diphenhydrAMINE (BENADRYL) injection 50 mg, 50 mg, Intramuscular, Q4H PRN, Ivonne Klinefelter, PA-C    docusate sodium (COLACE) capsule 100 mg, 100 mg, Oral, Q6H PRN, Ivonne Klinefelter, PA-C    haloperidol (HALDOL) tablet 10 mg, 10 mg, Oral, Q4H PRN, Ivonne Klinefelter, PA-C, 10 mg at 07/28/18 1953    haloperidol (HALDOL) tablet 10 mg, 10 mg, Oral, TID, Phuc Meyer BRANDON Haque, 10 mg at 08/11/18 0846    haloperidol lactate (HALDOL) injection 10 mg, 10 mg, Intramuscular, Q4H PRN, Blaise Wade PA-C    hydrOXYzine HCL (ATARAX) tablet 100 mg, 100 mg, Oral, Q6H PRN, DAVID Weiss-C, 100 mg at 08/10/18 0848    ibuprofen (MOTRIN) tablet 400 mg, 400 mg, Oral, Q6H PRN, Blaise Wade PA-C    loperamide (IMODIUM) capsule 2 mg, 2 mg, Oral, Q4H PRN, DAVID Weiss-C, 2 mg at 08/04/18 0836    LORazepam (ATIVAN) 2 mg/mL injection 2 mg, 2 mg, Intramuscular, Q6H PRN, Blaise Wade PA-C    LORazepam (ATIVAN) tablet 1 mg, 1 mg, Oral, Q4H PRN, MAYA WeissC, 1 mg at 08/08/18 2006    melatonin tablet 9 mg, 9 mg, Oral, HS PRN, Blaise Wade PA-C    nicotine polacrilex (NICORETTE) gum 2 mg, 2 mg, Oral, Q4H PRN, DAVID Weiss-C, 2 mg at 08/11/18 0849    OLANZapine (ZyPREXA) tablet 10 mg, 10 mg, Oral, Daily, DAVID Weiss-C, 10 mg at 08/11/18 0846    OLANZapine (ZyPREXA) tablet 20 mg, 20 mg, Oral, HS, DAVID Weiss-C, 20 mg at 08/10/18 2124    pantoprazole (PROTONIX) EC tablet 40 mg, 40 mg, Oral, Early Morning, DAVID Weiss-ROSA, 40 mg at 08/11/18 0701    polyethylene glycol (MIRALAX) packet 17 g, 17 g, Oral, BID PRN, Blaise Wade PA-C      Objective:     Vital Signs:  Vitals:    08/09/18 0816 08/10/18 0747 08/10/18 0749 08/11/18 0730   BP: 123/58 117/67 115/76 101/74   BP Location: Left arm Left arm Left arm Left arm   Pulse: 60 80 86 104   Resp: 18 16  18   Temp: 98 1 °F (36 7 °C) 98 2 °F (36 8 °C)  97 9 °F (36 6 °C)   TempSrc: Temporal Temporal  Temporal   SpO2:       Weight:       Height:             Appearance:  age appropriate and casually dressed   Behavior:  normal   Speech:  normal volume   Mood:  labile   Affect:  increased in intensity   Thought Process:  disorganized and flight of ideas   Thought Content:  delusions  grandiose and persecutory   Perceptual Disturbances: None   Risk Potential: none Sensorium:  person, place and time   Cognition:  intact   Consciousness:  alert and awake    Attention: poor   Intellect: average   Insight:  poor   Judgment: poor      Motor Activity: no abnormal movements           Recent Labs:  Results Reviewed     Procedure Component Value Units Date/Time    Urine Microscopic [[id number]]  (Abnormal) Collected:  07/19/18 1759    Lab Status:  Final result Specimen:  Urine from Urine, Clean Catch Updated:  07/19/18 1857     RBC, UA 2-4 (A) /hpf      WBC, UA 4-10 (A) /hpf      Epithelial Cells Occasional /hpf      Bacteria, UA None Seen /hpf     Rapid drug screen, urine [[id number]]  (Normal) Collected:  07/19/18 1759    Lab Status:  Final result Specimen:  Urine from Urine, Clean Catch Updated:  07/19/18 1839     Amph/Meth UR Negative     Barbiturate Ur Negative     Benzodiazepine Urine Negative     Cocaine Urine Negative     Methadone Urine Negative     Opiate Urine Negative     PCP Ur Negative     THC Urine Negative    Narrative:         FOR MEDICAL PURPOSES ONLY  IF CONFIRMATION NEEDED PLEASE CONTACT THE LAB WITHIN 5 DAYS      Drug Screen Cutoff Levels:  AMPHETAMINE/METHAMPHETAMINES  1000 ng/mL  BARBITURATES     200 ng/mL  BENZODIAZEPINES     200 ng/mL  COCAINE      300 ng/mL  METHADONE      300 ng/mL  OPIATES      300 ng/mL  PHENCYCLIDINE     25 ng/mL  THC       50 ng/mL    Ethanol [[id number]]  (Normal) Collected:  07/19/18 1757    Lab Status:  Final result Specimen:  Blood from Arm, Left Updated:  07/19/18 1826     Ethanol Lvl <10 mg/dL     Comprehensive metabolic panel [[id number]]  (Abnormal) Collected:  07/19/18 1757    Lab Status:  Final result Specimen:  Blood from Arm, Left Updated:  07/19/18 1826     Sodium 144 mmol/L      Potassium 3 5 (L) mmol/L      Chloride 106 mmol/L      CO2 28 mmol/L      Anion Gap 10 mmol/L      BUN 19 mg/dL      Creatinine 1 02 mg/dL      Glucose 117 (H) mg/dL      Calcium 9 6 mg/dL      AST 41 U/L      ALT 49 U/L      Alkaline Phosphatase 72 U/L Total Protein 7 8 g/dL      Albumin 4 6 g/dL      Total Bilirubin 0 60 mg/dL      eGFR 86 ml/min/1 73sq m     Narrative:         National Kidney Disease Education Program recommendations are as follows:  GFR calculation is accurate only with a steady state creatinine  Chronic Kidney disease less than 60 ml/min/1 73 sq  meters  Kidney failure less than 15 ml/min/1 73 sq  meters  UA w Reflex to Microscopic [[id number]]  (Abnormal) Collected:  07/19/18 1759    Lab Status:  Final result Specimen:  Urine from Urine, Clean Catch Updated:  07/19/18 1823     Color, UA Yellow     Clarity, UA Clear     Specific Gravity, UA 1 025     pH, UA 5 0     Leukocytes, UA 25 0 (A)     Nitrite, UA Negative     Protein, UA 30 (1+) (A) mg/dl      Glucose, UA Negative mg/dl      Ketones, UA Negative mg/dl      Bilirubin, UA Negative     Blood, UA 10 0 (A)     UROBILINOGEN UA Negative mg/dL     CBC and differential [[id number]]  (Abnormal) Collected:  07/19/18 1757    Lab Status:  Final result Specimen:  Blood from Arm, Left Updated:  07/19/18 1811     WBC 7 20 Thousand/uL      RBC 4 35 (L) Million/uL      Hemoglobin 13 4 (L) g/dL      Hematocrit 39 8 (L) %      MCV 92 fL      MCH 30 9 pg      MCHC 33 8 g/dL      RDW 14 1 %      MPV 9 0 fL      Platelets 392 Thousands/uL      Neutrophils Relative 58 %      Lymphocytes Relative 32 %      Monocytes Relative 8 %      Eosinophils Relative 2 %      Basophils Relative 1 %      Neutrophils Absolute 4 20 Thousands/µL      Lymphocytes Absolute 2 30 Thousands/µL      Monocytes Absolute 0 60 Thousand/µL      Eosinophils Absolute 0 10 Thousand/µL      Basophils Absolute 0 10 Thousands/µL           I/O Past 24 hours:  No intake/output data recorded  No intake/output data recorded  Assessment / Plan:     [facility]ive [facility], [facility]    Recommended Treatment:      Medication changes:  Initiate Protonix      Non-pharmacological treatments  1) Continue with group therapy, milieu therapy and occupational therapy  Safety  1) Safety/communication plan established targeting dynamic risk factors above  2) Risks, benefits, and possible side effects of medications explained to patient and patient verbalizes understanding  Counseling / Coordination of Care    Total floor / unit time spent today 20 minutes  Greater than 50% of total time was spent with the patient and / or family counseling and / or coordination of care  A description of the counseling / coordination of care  Patient's Rights, confidentiality and exceptions to confidentiality, use of automated medical record, [facility] staff access to medical record, and consent to treatment reviewed      Jayesh Gonsales PA-C

## 2018-08-12 RX ADMIN — NICOTINE POLACRILEX 2 MG: 2 GUM, CHEWING ORAL at 08:32

## 2018-08-12 RX ADMIN — HALOPERIDOL 10 MG: 5 TABLET ORAL at 08:03

## 2018-08-12 RX ADMIN — OLANZAPINE 20 MG: 10 TABLET, FILM COATED ORAL at 21:00

## 2018-08-12 RX ADMIN — OLANZAPINE 10 MG: 10 TABLET, FILM COATED ORAL at 08:04

## 2018-08-12 RX ADMIN — HALOPERIDOL 10 MG: 5 TABLET ORAL at 20:58

## 2018-08-12 RX ADMIN — HALOPERIDOL 10 MG: 5 TABLET ORAL at 15:48

## 2018-08-12 RX ADMIN — BENZTROPINE MESYLATE 0.5 MG: 0.5 TABLET ORAL at 20:57

## 2018-08-12 RX ADMIN — PANTOPRAZOLE SODIUM 40 MG: 40 TABLET, DELAYED RELEASE ORAL at 06:31

## 2018-08-12 RX ADMIN — LORAZEPAM 1 MG: 1 TABLET ORAL at 19:41

## 2018-08-12 RX ADMIN — BENZTROPINE MESYLATE 0.5 MG: 0.5 TABLET ORAL at 08:04

## 2018-08-12 RX ADMIN — NICOTINE POLACRILEX 2 MG: 2 GUM, CHEWING ORAL at 12:58

## 2018-08-12 RX ADMIN — BENZTROPINE MESYLATE 0.5 MG: 0.5 TABLET ORAL at 15:48

## 2018-08-12 NOTE — PROGRESS NOTES
Psychiatry Progress Note    Subjective: Interval History     Patient visible on the unit  Patient has been appropriately interacting with his peers  Patient has been calm and cooperative with staff  Patient reports that he feels that his body has adjusted to his medications  Patient again asking about his outpatient follow-up and if he would be able to be seen on a monthly basis  Patient still expressing that he feels that he should have AA meetings to make sure that he does not relapse on alcohol  Patient however has not had a true alcoholic beverage in approximately 4 years  Patient does report that he had bought extract and had been mixing this and vinegar to make cocktails  Patient less tangential during assessment this morning  Patient appropriately socializing with author  Patient reports some anxiety yesterday over peer on the unit being loud and disruptive  Patient with no complaints or concerns this morning  Patient with no adverse effects medications  Patient with no further GI distress      Current medications:    Current Facility-Administered Medications:     acetaminophen (TYLENOL) tablet 650 mg, 650 mg, Oral, Q4H PRN, Dulce Perry MD    benztropine (COGENTIN) tablet 0 5 mg, 0 5 mg, Oral, TID, Maria T Carlin PA-C, 0 5 mg at 08/12/18 0804    benztropine (COGENTIN) tablet 1 mg, 1 mg, Oral, Q4H PRN, Maria T Carlin PA-C, 1 mg at 07/28/18 1952    diphenhydrAMINE (BENADRYL) injection 50 mg, 50 mg, Intramuscular, Q4H PRN, Maria T Carlin PA-C    docusate sodium (COLACE) capsule 100 mg, 100 mg, Oral, Q6H PRN, Maria T Carlin PA-C    haloperidol (HALDOL) tablet 10 mg, 10 mg, Oral, Q4H PRN, Maria T Carlin PA-C, 10 mg at 07/28/18 1953    haloperidol (HALDOL) tablet 10 mg, 10 mg, Oral, TID, Maria T Carlin, PA-C, 10 mg at 08/12/18 0803    haloperidol lactate (HALDOL) injection 10 mg, 10 mg, Intramuscular, Q4H PRN, Maria T Carlin PA-C    hydrOXYzine HCL (ATARAX) tablet 100 mg, 100 mg, Oral, Q6H PRN, Ethelda Eloy, PA-C, 100 mg at 08/10/18 0848    ibuprofen (MOTRIN) tablet 400 mg, 400 mg, Oral, Q6H PRN, Ethelda Eloy, PA-C    loperamide (IMODIUM) capsule 2 mg, 2 mg, Oral, Q4H PRN, Ethelda Eloy, PA-C, 2 mg at 08/04/18 0836    LORazepam (ATIVAN) 2 mg/mL injection 2 mg, 2 mg, Intramuscular, Q6H PRN, Ethelda Eloy, PA-C    LORazepam (ATIVAN) tablet 1 mg, 1 mg, Oral, Q4H PRN, Ethelda Eloy, PA-C, 1 mg at 08/11/18 1918    melatonin tablet 9 mg, 9 mg, Oral, HS PRN, Ethelda Patagonia, PA-C    nicotine polacrilex (NICORETTE) gum 2 mg, 2 mg, Oral, Q4H PRN, Ethelda Eloy, PA-C, 2 mg at 08/12/18 0832    OLANZapine (ZyPREXA) tablet 10 mg, 10 mg, Oral, Daily, Ethelda Eloy, PA-C, 10 mg at 08/12/18 0804    OLANZapine (ZyPREXA) tablet 20 mg, 20 mg, Oral, HS, Ethelda Patagonia, PA-C, 20 mg at 08/11/18 2126    pantoprazole (PROTONIX) EC tablet 40 mg, 40 mg, Oral, Early Morning, Ethelda Eloy, PA-C, 40 mg at 08/12/18 0631    polyethylene glycol (MIRALAX) packet 17 g, 17 g, Oral, BID PRN, Ethelda Eloy, PA-C      Objective:     Vital Signs:  Vitals:    08/10/18 0749 08/11/18 0730 08/12/18 0700 08/12/18 0701   BP: 115/76 101/74 (!) 81/43 93/55   BP Location: Left arm Left arm Left arm Left arm   Pulse: 86 104 67 76   Resp:  18 16    Temp:  97 9 °F (36 6 °C) 98 1 °F (36 7 °C)    TempSrc:  Temporal Temporal    SpO2:       Weight:   72 1 kg (159 lb)    Height:             Appearance:  age appropriate and casually dressed   Behavior:  normal   Speech:  normal volume   Mood:  labile   Affect:  increased in intensity   Thought Process:  disorganized and flight of ideas   Thought Content:  delusions  grandiose and persecutory   Perceptual Disturbances: None   Risk Potential: none   Sensorium:  person, place and time   Cognition:  intact   Consciousness:  alert and awake    Attention: poor   Intellect: average   Insight:  poor   Judgment: poor      Motor Activity: no abnormal movements           Recent Labs:  Results Reviewed     Procedure Component Value Units Date/Time    Urine Microscopic [[id number]]  (Abnormal) Collected:  07/19/18 1759    Lab Status:  Final result Specimen:  Urine from Urine, Clean Catch Updated:  07/19/18 1857     RBC, UA 2-4 (A) /hpf      WBC, UA 4-10 (A) /hpf      Epithelial Cells Occasional /hpf      Bacteria, UA None Seen /hpf     Rapid drug screen, urine [[id number]]  (Normal) Collected:  07/19/18 1759    Lab Status:  Final result Specimen:  Urine from Urine, Clean Catch Updated:  07/19/18 1839     Amph/Meth UR Negative     Barbiturate Ur Negative     Benzodiazepine Urine Negative     Cocaine Urine Negative     Methadone Urine Negative     Opiate Urine Negative     PCP Ur Negative     THC Urine Negative    Narrative:         FOR MEDICAL PURPOSES ONLY  IF CONFIRMATION NEEDED PLEASE CONTACT THE LAB WITHIN 5 DAYS      Drug Screen Cutoff Levels:  AMPHETAMINE/METHAMPHETAMINES  1000 ng/mL  BARBITURATES     200 ng/mL  BENZODIAZEPINES     200 ng/mL  COCAINE      300 ng/mL  METHADONE      300 ng/mL  OPIATES      300 ng/mL  PHENCYCLIDINE     25 ng/mL  THC       50 ng/mL    Ethanol [[id number]]  (Normal) Collected:  07/19/18 1757    Lab Status:  Final result Specimen:  Blood from Arm, Left Updated:  07/19/18 1826     Ethanol Lvl <10 mg/dL     Comprehensive metabolic panel [[id number]]  (Abnormal) Collected:  07/19/18 1757    Lab Status:  Final result Specimen:  Blood from Arm, Left Updated:  07/19/18 1826     Sodium 144 mmol/L      Potassium 3 5 (L) mmol/L      Chloride 106 mmol/L      CO2 28 mmol/L      Anion Gap 10 mmol/L      BUN 19 mg/dL      Creatinine 1 02 mg/dL      Glucose 117 (H) mg/dL      Calcium 9 6 mg/dL      AST 41 U/L      ALT 49 U/L      Alkaline Phosphatase 72 U/L      Total Protein 7 8 g/dL      Albumin 4 6 g/dL      Total Bilirubin 0 60 mg/dL      eGFR 86 ml/min/1 73sq m     Narrative:         National Kidney Disease Education Program recommendations are as follows:  GFR calculation is accurate only with a steady state creatinine  Chronic Kidney disease less than 60 ml/min/1 73 sq  meters  Kidney failure less than 15 ml/min/1 73 sq  meters  UA w Reflex to Microscopic [[id number]]  (Abnormal) Collected:  07/19/18 1759    Lab Status:  Final result Specimen:  Urine from Urine, Clean Catch Updated:  07/19/18 1823     Color, UA Yellow     Clarity, UA Clear     Specific Gravity, UA 1 025     pH, UA 5 0     Leukocytes, UA 25 0 (A)     Nitrite, UA Negative     Protein, UA 30 (1+) (A) mg/dl      Glucose, UA Negative mg/dl      Ketones, UA Negative mg/dl      Bilirubin, UA Negative     Blood, UA 10 0 (A)     UROBILINOGEN UA Negative mg/dL     CBC and differential [[id number]]  (Abnormal) Collected:  07/19/18 1757    Lab Status:  Final result Specimen:  Blood from Arm, Left Updated:  07/19/18 1811     WBC 7 20 Thousand/uL      RBC 4 35 (L) Million/uL      Hemoglobin 13 4 (L) g/dL      Hematocrit 39 8 (L) %      MCV 92 fL      MCH 30 9 pg      MCHC 33 8 g/dL      RDW 14 1 %      MPV 9 0 fL      Platelets 975 Thousands/uL      Neutrophils Relative 58 %      Lymphocytes Relative 32 %      Monocytes Relative 8 %      Eosinophils Relative 2 %      Basophils Relative 1 %      Neutrophils Absolute 4 20 Thousands/µL      Lymphocytes Absolute 2 30 Thousands/µL      Monocytes Absolute 0 60 Thousand/µL      Eosinophils Absolute 0 10 Thousand/µL      Basophils Absolute 0 10 Thousands/µL           I/O Past 24 hours:  No intake/output data recorded  No intake/output data recorded  Assessment / Plan:     [facility]oaffective disorder, bipolar type [facility]    Recommended Treatment:      Medication changes:  Initiate Protonix  Non-pharmacological treatments  1) Continue with group therapy, milieu therapy and occupational therapy  Safety  1) Safety/communication plan established targeting dynamic risk factors above      2) Risks, benefits, and possible side effects of medications explained to patient and patient verbalizes understanding  Counseling / Coordination of Care    Total floor / unit time spent today 20 minutes  Greater than 50% of total time was spent with the patient and / or family counseling and / or coordination of care  A description of the counseling / coordination of care  Patient's Rights, confidentiality and exceptions to confidentiality, use of automated medical record, [facility] staff access to medical record, and consent to treatment reviewed      Christopher Neal PA-C

## 2018-08-12 NOTE — PLAN OF CARE
Problem: Alteration in Thoughts and Perception  Goal: Verbalize thoughts and feelings  Interventions:  - Promote a nonjudgmental and trusting relationship with the patient through active listening and therapeutic communication  - Assess patient's level of functioning, behavior and potential for risk  - Engage patient in 1 on 1 interactions for a minimum of 15 minutes each session  - Encourage patient to express fears, feelings, frustrations, and discuss symptoms    - [place] patient to reality, help patient recognize reality-based thinking   - Administer medications as ordered and assess for potential side effects  - Provide the patient education related to the signs and symptoms of the illness and desired effects of prescribed medications   Outcome: Progressing      Problem: Ineffective Coping  Goal: Identifies ineffective coping skills  Outcome: Progressing    Goal: Participates in unit activities  Interventions:  - Provide therapeutic environment   - Provide required programming   - Redirect inappropriate behaviors    Outcome: Progressing      Problem: Risk for Self Injury/Neglect  Goal: Verbalize thoughts and feelings  Interventions:  - Assess and re-assess patient's lethality and potential for self-injury  - Engage patient in 1:1 interactions, daily, for a minimum of 15 minutes  - Encourage patient to express feelings, fears, frustrations, hopes  - Establish rapport/trust with patient    Outcome: Progressing      Problem: Alteration in Orientation  Goal: Attend and participate in unit activities, including therapeutic, recreational, and educational groups  Interventions:  - Provide therapeutic and educational activities daily, encourage attendance and participation, and document same in the medical record   - Provide appropriate opportunities for reminiscence   - Provide a consistent daily routine   - Encourage family contact/visitation    Outcome: Progressing      Comments: Patient with bright affect upon approach  Reports "some anxiety" rated a 2/4  Denies depression, S/HI,A/VH and pain  Visible and social with peers  Joking and laughing  Med and meal compliant  Patient given nicorette gum as requested  Appears more clear in thought  Less irritable than in past  Will continue to monitor and provide therapeutic support

## 2018-08-12 NOTE — NURSING NOTE
Patient visible on unit at start of shift  Minimal interaction with peers  Patient approached staff and requested a PRN for his 3/4 anxiety  Patient stated his peers are loud and it is making him feel anxious  Ativan 1mg and support given, Patients thinking was clear and organized and behavior controlled  Patient denies anymore emesis episodes and stated he is feeling "much better"   Patient was HS med compliant and reported the ativan was effective and his anxiety is 1/4  Patient retreated to room without issue  Will continue to monitor

## 2018-08-13 RX ADMIN — OLANZAPINE 20 MG: 10 TABLET, FILM COATED ORAL at 21:58

## 2018-08-13 RX ADMIN — HALOPERIDOL 10 MG: 5 TABLET ORAL at 08:21

## 2018-08-13 RX ADMIN — HALOPERIDOL 10 MG: 5 TABLET ORAL at 17:15

## 2018-08-13 RX ADMIN — PANTOPRAZOLE SODIUM 40 MG: 40 TABLET, DELAYED RELEASE ORAL at 06:46

## 2018-08-13 RX ADMIN — BENZTROPINE MESYLATE 0.5 MG: 0.5 TABLET ORAL at 08:22

## 2018-08-13 RX ADMIN — BENZTROPINE MESYLATE 0.5 MG: 0.5 TABLET ORAL at 17:15

## 2018-08-13 RX ADMIN — NICOTINE POLACRILEX 2 MG: 2 GUM, CHEWING ORAL at 19:54

## 2018-08-13 RX ADMIN — NICOTINE POLACRILEX 2 MG: 2 GUM, CHEWING ORAL at 08:21

## 2018-08-13 RX ADMIN — HALOPERIDOL 10 MG: 5 TABLET ORAL at 21:57

## 2018-08-13 RX ADMIN — LORAZEPAM 1 MG: 1 TABLET ORAL at 13:43

## 2018-08-13 RX ADMIN — NICOTINE POLACRILEX 2 MG: 2 GUM, CHEWING ORAL at 12:49

## 2018-08-13 RX ADMIN — OLANZAPINE 10 MG: 10 TABLET, FILM COATED ORAL at 08:22

## 2018-08-13 RX ADMIN — BENZTROPINE MESYLATE 0.5 MG: 0.5 TABLET ORAL at 21:57

## 2018-08-13 RX ADMIN — LORAZEPAM 1 MG: 1 TABLET ORAL at 08:21

## 2018-08-13 NOTE — NURSING NOTE
Patient visible on unit at start of shift  Upon initial assessment patient stated he is anxious "because of all of the people on the unit and how loud it is" requested and received PRN ativan  Patient stated it was effective  HS medication compliant with ease  Patient remained calm and cooperative throughout  Patient retreated to room for sleep without issue  Will continue to monitor

## 2018-08-13 NOTE — PROGRESS NOTES
Pt attended self discovery group  cooperative and pleasant  Pt needs further education on drug and alcohol needs and lacks insight into problem solving and coping skills with alcohol relapse  Briefly discussed possibilities of f/u with peer support or AA meetings for further resources to assist with needs  Reviewed Psychoeducational how stress affects you, when you are vulnerable to stress, ways to reduce stress and getting help information resource from [facility]  Continue to provide therapeutic group support

## 2018-08-13 NOTE — PLAN OF CARE
Problem: DISCHARGE PLANNING  Goal: Discharge to home or other facility with appropriate resources  INTERVENTIONS:  - Identify barriers to discharge w/patient and caregiver  - Arrange for needed discharge resources and transportation as appropriate  - Identify discharge learning needs (meds, wound care, etc )  - Arrange for interpretive services to assist at discharge as needed  -Patient will return home  - will arrange outpatient psychiatric services  - Refer to Case Management Department for coordinating discharge planning if the patient needs post-hospital services based on physician/advanced practitioner order or complex needs related to functional status, cognitive ability, or social support system    Outcome: Progressing  Patient showing improvement, he has been compliant with his medications and is now asking about long acting injection  Patient will be referred to an outpatient psychiatric provider once a discharge date is determined

## 2018-08-13 NOTE — PROGRESS NOTES
Patient presents with anxious affect and mood  He was pleasant and cooperative  He is anxious over not knowing his discharge date and a disruptive peer, he was medicated with prn  He denies depression, anxiety as above  Denies SI/HI denies hallucinations  He is visible on the unit and seen interacting with some peers  He was med/meal compliant  Staff will continue to monitor for safety and well being

## 2018-08-13 NOTE — PROGRESS NOTES
Psychiatry Progress Note    Subjective:   Interval History     Pt is willing to accept FLANAGAN of haldol; less delusional, but rambles on and on, hard to follow      Current medications:    Current Facility-Administered Medications:     acetaminophen (TYLENOL) tablet 650 mg, 650 mg, Oral, Q4H PRN, Lindsay Bellamy MD    benztropine (COGENTIN) tablet 0 5 mg, 0 5 mg, Oral, TID, Alyson Jarrett PA-C, 0 5 mg at 08/13/18 1715    benztropine (COGENTIN) tablet 1 mg, 1 mg, Oral, Q4H PRN, DAVID Rubalcava-C, 1 mg at 07/28/18 1952    diphenhydrAMINE (BENADRYL) injection 50 mg, 50 mg, Intramuscular, Q4H PRN, Alyson Jarrett PA-C    docusate sodium (COLACE) capsule 100 mg, 100 mg, Oral, Q6H PRN, Alyson Jarrett PA-C    haloperidol (HALDOL) tablet 10 mg, 10 mg, Oral, Q4H PRN, Alyson Jarrett PA-C, 10 mg at 07/28/18 1953    haloperidol (HALDOL) tablet 10 mg, 10 mg, Oral, TID, DAVID Rubalcava-C, 10 mg at 08/13/18 1715    haloperidol lactate (HALDOL) injection 10 mg, 10 mg, Intramuscular, Q4H PRN, Alyson Jarrett PA-C    hydrOXYzine HCL (ATARAX) tablet 100 mg, 100 mg, Oral, Q6H PRN, Alyson Jarrett PA-C, 100 mg at 08/10/18 0848    ibuprofen (MOTRIN) tablet 400 mg, 400 mg, Oral, Q6H PRN, Alyson Jrarett PA-C    loperamide (IMODIUM) capsule 2 mg, 2 mg, Oral, Q4H PRN, Alyson Jarrett PA-C, 2 mg at 08/04/18 0836    LORazepam (ATIVAN) 2 mg/mL injection 2 mg, 2 mg, Intramuscular, Q6H PRN, Alyson Jarrett PA-C    LORazepam (ATIVAN) tablet 1 mg, 1 mg, Oral, Q4H PRN, Alyson Jarrett PA-C, 1 mg at 08/13/18 1343    melatonin tablet 9 mg, 9 mg, Oral, HS PRN, Alyson Jarrett PA-C    nicotine polacrilex (NICORETTE) gum 2 mg, 2 mg, Oral, Q4H PRN, Alyson Jarrett PA-C, 2 mg at 08/13/18 1249    OLANZapine (ZyPREXA) tablet 10 mg, 10 mg, Oral, Daily, Alyson Jarrett PA-C, 10 mg at 08/13/18 0034    OLANZapine (ZyPREXA) tablet 20 mg, 20 mg, Oral, HS, Alyson Jarrett PA-C, 20 mg at 08/12/18 2100    pantoprazole (PROTONIX) EC tablet 40 mg, 40 mg, Oral, Early Morning, Elen Mitchell PA-C, 40 mg at 08/13/18 0646    polyethylene glycol (MIRALAX) packet 17 g, 17 g, Oral, BID PRN, Elen Mitchell PA-C      Objective:     Vital Signs:  Vitals:    08/12/18 0701 08/13/18 0754 08/13/18 0756 08/13/18 0757   BP: 93/55 129/78 98/73 98/73   BP Location: Left arm Left arm Left arm Left arm   Pulse: 76 62 78 78   Resp:  16     Temp:  (!) 97 °F (36 1 °C)     TempSrc:  Temporal     SpO2:  99%     Weight:       Height:             Appearance:  age appropriate and casually dressed   Behavior:  normal   Speech:  normal volume   Mood:  depressed   Affect:  constricted   Thought Process:  disorganized   Thought Content:  delusions  bizarre   Perceptual Disturbances: None   Risk Potential: none   Sensorium:  person, place, situation and time   Cognition:  intact   Consciousness:  alert and awake    Attention: attention span and concentration were age appropriate   Intellect: average   Insight:  poor   Judgment: poor      Motor Activity: no abnormal movements           Recent Labs:  Results Reviewed     Procedure Component Value Units Date/Time    Urine Microscopic [[id number]]  (Abnormal) Collected:  07/19/18 1759    Lab Status:  Final result Specimen:  Urine from Urine, Clean Catch Updated:  07/19/18 1857     RBC, UA 2-4 (A) /hpf      WBC, UA 4-10 (A) /hpf      Epithelial Cells Occasional /hpf      Bacteria, UA None Seen /hpf     Rapid drug screen, urine [[id number]]  (Normal) Collected:  07/19/18 1759    Lab Status:  Final result Specimen:  Urine from Urine, Clean Catch Updated:  07/19/18 1839     Amph/Meth UR Negative     Barbiturate Ur Negative     Benzodiazepine Urine Negative     Cocaine Urine Negative     Methadone Urine Negative     Opiate Urine Negative     PCP Ur Negative     THC Urine Negative    Narrative:         FOR MEDICAL PURPOSES ONLY     IF CONFIRMATION NEEDED PLEASE CONTACT THE LAB WITHIN 5 DAYS     Drug Screen Cutoff Levels:  AMPHETAMINE/METHAMPHETAMINES  1000 ng/mL  BARBITURATES     200 ng/mL  BENZODIAZEPINES     200 ng/mL  COCAINE      300 ng/mL  METHADONE      300 ng/mL  OPIATES      300 ng/mL  PHENCYCLIDINE     25 ng/mL  THC       50 ng/mL    Ethanol [[id number]]  (Normal) Collected:  07/19/18 1757    Lab Status:  Final result Specimen:  Blood from Arm, Left Updated:  07/19/18 1826     Ethanol Lvl <10 mg/dL     Comprehensive metabolic panel [[id number]]  (Abnormal) Collected:  07/19/18 1757    Lab Status:  Final result Specimen:  Blood from Arm, Left Updated:  07/19/18 1826     Sodium 144 mmol/L      Potassium 3 5 (L) mmol/L      Chloride 106 mmol/L      CO2 28 mmol/L      Anion Gap 10 mmol/L      BUN 19 mg/dL      Creatinine 1 02 mg/dL      Glucose 117 (H) mg/dL      Calcium 9 6 mg/dL      AST 41 U/L      ALT 49 U/L      Alkaline Phosphatase 72 U/L      Total Protein 7 8 g/dL      Albumin 4 6 g/dL      Total Bilirubin 0 60 mg/dL      eGFR 86 ml/min/1 73sq m     Narrative:         National Kidney Disease Education Program recommendations are as follows:  GFR calculation is accurate only with a steady state creatinine  Chronic Kidney disease less than 60 ml/min/1 73 sq  meters  Kidney failure less than 15 ml/min/1 73 sq  meters      UA w Reflex to Microscopic [[id number]]  (Abnormal) Collected:  07/19/18 1759    Lab Status:  Final result Specimen:  Urine from Urine, Clean Catch Updated:  07/19/18 1823     Color, UA Yellow     Clarity, UA Clear     Specific Gravity, UA 1 025     pH, UA 5 0     Leukocytes, UA 25 0 (A)     Nitrite, UA Negative     Protein, UA 30 (1+) (A) mg/dl      Glucose, UA Negative mg/dl      Ketones, UA Negative mg/dl      Bilirubin, UA Negative     Blood, UA 10 0 (A)     UROBILINOGEN UA Negative mg/dL     CBC and differential [[id number]]  (Abnormal) Collected:  07/19/18 1757    Lab Status:  Final result Specimen:  Blood from Arm, Left Updated:  07/19/18 1811     WBC 7 20 Thousand/uL RBC 4 35 (L) Million/uL      Hemoglobin 13 4 (L) g/dL      Hematocrit 39 8 (L) %      MCV 92 fL      MCH 30 9 pg      MCHC 33 8 g/dL      RDW 14 1 %      MPV 9 0 fL      Platelets 030 Thousands/uL      Neutrophils Relative 58 %      Lymphocytes Relative 32 %      Monocytes Relative 8 %      Eosinophils Relative 2 %      Basophils Relative 1 %      Neutrophils Absolute 4 20 Thousands/µL      Lymphocytes Absolute 2 30 Thousands/µL      Monocytes Absolute 0 60 Thousand/µL      Eosinophils Absolute 0 10 Thousand/µL      Basophils Absolute 0 10 Thousands/µL           I/O Past 24 hours:  No intake/output data recorded  No intake/output data recorded  Assessment / Plan:     Schizoaffective disorder, bipolar type ([facility] )    Recommended Treatment:      Medication changes:  1) will need FLANAGAN of haldol dec    Non-pharmacological treatments  1) Continue with group therapy, milieu therapy and occupational therapy  Safety  1) Safety/communication plan established targeting dynamic risk factors above  2) Risks, benefits, and possible side effects of medications explained to patient and patient verbalizes understanding  Counseling / Coordination of Care    Total floor / unit time spent today 20 minutes  Greater than 50% of total time was spent with the patient and / or family counseling and / or coordination of care  A description of the counseling / coordination of care  Patient's Rights, confidentiality and exceptions to confidentiality, use of automated medical record, [facility] staff access to medical record, and consent to treatment reviewed      Deejaykain Rossi MD

## 2018-08-13 NOTE — SOCIAL WORK
Patient asking worker about a discharge date  Worker informed him that there is no discharge date for him at this time  Worker asked patient if he spoke with attending psychiatrist regarding the long acting injection, he reported yes  Patient then reporting that if he could receive an injection of Haldol, Ativan, Cogentin and Zyprexa that would be best   Worker informed him that it is not possible  Patient still reporting that he would receive the Haldol Dec IM as long as he can receive it as an outpatient  Worker informed that it is possible  Worker will follow-up with attending psychiatrist regarding such

## 2018-08-13 NOTE — PLAN OF CARE
Problem: Alteration in Thoughts and Perception  Goal: Verbalize thoughts and feelings  Interventions:  - Promote a nonjudgmental and trusting relationship with the patient through active listening and therapeutic communication  - Assess patient's level of functioning, behavior and potential for risk  - Engage patient in 1 on 1 interactions for a minimum of 15 minutes each session  - Encourage patient to express fears, feelings, frustrations, and discuss symptoms    - [place] patient to reality, help patient recognize reality-based thinking   - Administer medications as ordered and assess for potential side effects  - Provide the patient education related to the signs and symptoms of the illness and desired effects of prescribed medications   Outcome: Progressing      Problem: Ineffective Coping  Goal: Identifies ineffective coping skills  Outcome: Progressing    Goal: Participates in unit activities  Interventions:  - Provide therapeutic environment   - Provide required programming   - Redirect inappropriate behaviors    Outcome: Progressing      Problem: Risk for Self Injury/Neglect  Goal: Verbalize thoughts and feelings  Interventions:  - Assess and re-assess patient's lethality and potential for self-injury  - Engage patient in 1:1 interactions, daily, for a minimum of 15 minutes  - Encourage patient to express feelings, fears, frustrations, hopes  - Establish rapport/trust with patient    Outcome: Progressing      Problem: Alteration in Orientation  Goal: Attend and participate in unit activities, including therapeutic, recreational, and educational groups  Interventions:  - Provide therapeutic and educational activities daily, encourage attendance and participation, and document same in the medical record   - Provide appropriate opportunities for reminiscence   - Provide a consistent daily routine   - Encourage family contact/visitation    Outcome: Progressing      Comments: Patient has been expressing himself more lately  He has been denying hallucinations, attending groups and being med compliant

## 2018-08-14 RX ORDER — HALOPERIDOL DECANOATE 50 MG/ML
50 INJECTION INTRAMUSCULAR
Status: DISCONTINUED | OUTPATIENT
Start: 2018-08-14 | End: 2018-08-16 | Stop reason: HOSPADM

## 2018-08-14 RX ADMIN — OLANZAPINE 10 MG: 10 TABLET, FILM COATED ORAL at 08:05

## 2018-08-14 RX ADMIN — HALOPERIDOL DECANOATE 50 MG: 50 INJECTION INTRAMUSCULAR at 15:17

## 2018-08-14 RX ADMIN — NICOTINE POLACRILEX 2 MG: 2 GUM, CHEWING ORAL at 08:05

## 2018-08-14 RX ADMIN — PANTOPRAZOLE SODIUM 40 MG: 40 TABLET, DELAYED RELEASE ORAL at 06:31

## 2018-08-14 RX ADMIN — BENZTROPINE MESYLATE 0.5 MG: 0.5 TABLET ORAL at 21:16

## 2018-08-14 RX ADMIN — LORAZEPAM 1 MG: 1 TABLET ORAL at 15:17

## 2018-08-14 RX ADMIN — BENZTROPINE MESYLATE 0.5 MG: 0.5 TABLET ORAL at 15:17

## 2018-08-14 RX ADMIN — HALOPERIDOL 10 MG: 5 TABLET ORAL at 21:17

## 2018-08-14 RX ADMIN — HALOPERIDOL 10 MG: 5 TABLET ORAL at 08:05

## 2018-08-14 RX ADMIN — HALOPERIDOL 10 MG: 5 TABLET ORAL at 15:17

## 2018-08-14 RX ADMIN — LORAZEPAM 1 MG: 1 TABLET ORAL at 08:05

## 2018-08-14 RX ADMIN — BENZTROPINE MESYLATE 0.5 MG: 0.5 TABLET ORAL at 08:05

## 2018-08-14 RX ADMIN — OLANZAPINE 20 MG: 10 TABLET, FILM COATED ORAL at 21:16

## 2018-08-14 NOTE — NURSING NOTE
Temi Wilson remains a 304  He is pleasant and cooperative  He is visible on the unit when he paces the rodrigez  He does not really interact with peers,but is somewhat social with staff  Nicorette gum given at [phone] [address]

## 2018-08-14 NOTE — SOCIAL WORK
Worker spoke with Lucy Mclaughlin at [facility] AT THE [address] and [facility], patient has an intake on 8/24 at 9:00am with Litzy Gastelum

## 2018-08-14 NOTE — PLAN OF CARE
Problem: DISCHARGE PLANNING  Goal: Discharge to home or other facility with appropriate resources  INTERVENTIONS:  - Identify barriers to discharge w/patient and caregiver  - Arrange for needed discharge resources and transportation as appropriate  - Identify discharge learning needs (meds, wound care, etc )  - Arrange for interpretive services to assist at discharge as needed  -Patient will return home  - will arrange outpatient psychiatric services  - Refer to Case Management Department for coordinating discharge planning if the patient needs post-hospital services based on physician/advanced practitioner order or complex needs related to functional status, cognitive ability, or social support system    Outcome: Progressing  Patient has an intake at [facility] AT THE [address] [place] [address] on 8/24 at 9:00am

## 2018-08-14 NOTE — SOCIAL WORK
Patient signed SERENA for [facility]  Worker left message at [address] [facility] regarding an intake and if they are accepting new clients

## 2018-08-14 NOTE — PROGRESS NOTES
Patient presents pleasant and cooperative  He has not been as visible on the unit lately  He has been anxious over the disruptive environment of the community  He is able to express his needs and feeling requesting prn as needed  He was given Haldol decanoate to his Lft Deltoid today  Staff will continue to monitor for safety and well being

## 2018-08-14 NOTE — PROGRESS NOTES
Patient appeared to be sleeping throughout the shift and was observed on q 15 minute rounds  No issues were noted, and no PRNs were given  Nursing will continue to monitor

## 2018-08-14 NOTE — PROGRESS NOTES
Psychiatry Progress Note    Subjective: Interval History     Patient, and cooperative during assessment  Patient with a more organized thought process this morning  Patient with no delusional thought content  Patient appropriately discussing administration of Haldol Decanoate and outpatient follow-up services  Patient educated on the risks and benefits of long-acting Haldol Dec and appropriately consenting to treatment this morning      Current medications:    Current Facility-Administered Medications:     acetaminophen (TYLENOL) tablet 650 mg, 650 mg, Oral, Q4H PRN, Shayan Craft MD    benztropine (COGENTIN) tablet 0 5 mg, 0 5 mg, Oral, TID, DAVID Kwong-ROSA, 0 5 mg at 08/14/18 0805    benztropine (COGENTIN) tablet 1 mg, 1 mg, Oral, Q4H PRN, DAVID Kwong-C, 1 mg at 07/28/18 1952    diphenhydrAMINE (BENADRYL) injection 50 mg, 50 mg, Intramuscular, Q4H PRN, Elen Mitchell PA-C    docusate sodium (COLACE) capsule 100 mg, 100 mg, Oral, Q6H PRN, Elen Mitchell PA-C    haloperidol (HALDOL) tablet 10 mg, 10 mg, Oral, Q4H PRN, DAVID Kwong-C, 10 mg at 07/28/18 1953    haloperidol (HALDOL) tablet 10 mg, 10 mg, Oral, TID, Elen Mitchell PA-C, 10 mg at 08/14/18 0805    haloperidol decanoate (HALDOL DECANOATE) IM injection 50 mg, 50 mg, Intramuscular, Q28 Days, Elen Mitchell PA-C    haloperidol lactate (HALDOL) injection 10 mg, 10 mg, Intramuscular, Q4H PRN, Elen Mitchell PA-C    hydrOXYzine HCL (ATARAX) tablet 100 mg, 100 mg, Oral, Q6H PRN, DAVID Kwong-ROSA, 100 mg at 08/10/18 0848    ibuprofen (MOTRIN) tablet 400 mg, 400 mg, Oral, Q6H PRN, MAYA KwongC    loperamide (IMODIUM) capsule 2 mg, 2 mg, Oral, Q4H PRN, DAVID Kwong-ROSA, 2 mg at 08/04/18 0836    LORazepam (ATIVAN) 2 mg/mL injection 2 mg, 2 mg, Intramuscular, Q6H PRN, Elen Mitchell PA-C    LORazepam (ATIVAN) tablet 1 mg, 1 mg, Oral, Q4H PRN, Elen Mitchell PA-C, 1 mg at 08/14/18 0805    melatonin tablet 9 mg, 9 mg, Oral, HS PRN, Heather Gonzalez PA-C    nicotine polacrilex (NICORETTE) gum 2 mg, 2 mg, Oral, Q4H PRN, Heather Gonzalez PA-C, 2 mg at 08/14/18 0805    OLANZapine (ZyPREXA) tablet 10 mg, 10 mg, Oral, Daily, Heather Gonzalez PA-C, 10 mg at 08/14/18 0805    OLANZapine (ZyPREXA) tablet 20 mg, 20 mg, Oral, HS, DAVID Mckeon-C, 20 mg at 08/13/18 2158    pantoprazole (PROTONIX) EC tablet 40 mg, 40 mg, Oral, Early Morning, Heather Gonzalez PA-C, 40 mg at 08/14/18 0631    polyethylene glycol (MIRALAX) packet 17 g, 17 g, Oral, BID PRN, Heather Gonzalez PA-C      Objective:     Vital Signs:  Vitals:    08/13/18 0756 08/13/18 0757 08/14/18 0700 08/14/18 0701   BP: 98/73 98/73 125/80 123/85   BP Location: Left arm Left arm Left arm Left arm   Pulse: 78 78 57 62   Resp:   16    Temp:   98 7 °F (37 1 °C)    TempSrc:   Temporal    SpO2:       Weight:       Height:             Appearance:  age appropriate and casually dressed   Behavior:  normal   Speech:  normal volume   Mood:  normal   Affect:  normal   Thought Process:  blocked   Thought Content:  normal   Perceptual Disturbances: None   Risk Potential: none   Sensorium:  person, place and time   Cognition:  intact   Consciousness:  alert and awake    Attention: poor   Intellect: average   Insight:  limited   Judgment: poor      Motor Activity: no abnormal movements           Recent Labs:  Results Reviewed     Procedure Component Value Units Date/Time    Urine Microscopic [[id number]]  (Abnormal) Collected:  07/19/18 1759    Lab Status:  Final result Specimen:  Urine from Urine, Clean Catch Updated:  07/19/18 1857     RBC, UA 2-4 (A) /hpf      WBC, UA 4-10 (A) /hpf      Epithelial Cells Occasional /hpf      Bacteria, UA None Seen /hpf     Rapid drug screen, urine [[id number]]  (Normal) Collected:  07/19/18 1759    Lab Status:  Final result Specimen:  Urine from Urine, Clean Catch Updated:  07/19/18 3243 Amph/Meth UR Negative     Barbiturate Ur Negative     Benzodiazepine Urine Negative     Cocaine Urine Negative     Methadone Urine Negative     Opiate Urine Negative     PCP Ur Negative     THC Urine Negative    Narrative:         FOR MEDICAL PURPOSES ONLY  IF CONFIRMATION NEEDED PLEASE CONTACT THE LAB WITHIN 5 DAYS  Drug Screen Cutoff Levels:  AMPHETAMINE/METHAMPHETAMINES  1000 ng/mL  BARBITURATES     200 ng/mL  BENZODIAZEPINES     200 ng/mL  COCAINE      300 ng/mL  METHADONE      300 ng/mL  OPIATES      300 ng/mL  PHENCYCLIDINE     25 ng/mL  THC       50 ng/mL    Ethanol [[id number]]  (Normal) Collected:  07/19/18 1757    Lab Status:  Final result Specimen:  Blood from Arm, Left Updated:  07/19/18 1826     Ethanol Lvl <10 mg/dL     Comprehensive metabolic panel [[id number]]  (Abnormal) Collected:  07/19/18 1757    Lab Status:  Final result Specimen:  Blood from Arm, Left Updated:  07/19/18 1826     Sodium 144 mmol/L      Potassium 3 5 (L) mmol/L      Chloride 106 mmol/L      CO2 28 mmol/L      Anion Gap 10 mmol/L      BUN 19 mg/dL      Creatinine 1 02 mg/dL      Glucose 117 (H) mg/dL      Calcium 9 6 mg/dL      AST 41 U/L      ALT 49 U/L      Alkaline Phosphatase 72 U/L      Total Protein 7 8 g/dL      Albumin 4 6 g/dL      Total Bilirubin 0 60 mg/dL      eGFR 86 ml/min/1 73sq m     Narrative:         National Kidney Disease Education Program recommendations are as follows:  GFR calculation is accurate only with a steady state creatinine  Chronic Kidney disease less than 60 ml/min/1 73 sq  meters  Kidney failure less than 15 ml/min/1 73 sq  meters      UA w Reflex to Microscopic [[id number]]  (Abnormal) Collected:  07/19/18 1759    Lab Status:  Final result Specimen:  Urine from Urine, Clean Catch Updated:  07/19/18 1823     Color, UA Yellow     Clarity, UA Clear     Specific Gravity, UA 1 025     pH, UA 5 0     Leukocytes, UA 25 0 (A)     Nitrite, UA Negative     Protein, UA 30 (1+) (A) mg/dl      Glucose, UA Negative mg/dl      Ketones, UA Negative mg/dl      Bilirubin, UA Negative     Blood, UA 10 0 (A)     UROBILINOGEN UA Negative mg/dL     CBC and differential [[id number]]  (Abnormal) Collected:  07/19/18 1757    Lab Status:  Final result Specimen:  Blood from Arm, Left Updated:  07/19/18 1811     WBC 7 20 Thousand/uL      RBC 4 35 (L) Million/uL      Hemoglobin 13 4 (L) g/dL      Hematocrit 39 8 (L) %      MCV 92 fL      MCH 30 9 pg      MCHC 33 8 g/dL      RDW 14 1 %      MPV 9 0 fL      Platelets 426 Thousands/uL      Neutrophils Relative 58 %      Lymphocytes Relative 32 %      Monocytes Relative 8 %      Eosinophils Relative 2 %      Basophils Relative 1 %      Neutrophils Absolute 4 20 Thousands/µL      Lymphocytes Absolute 2 30 Thousands/µL      Monocytes Absolute 0 60 Thousand/µL      Eosinophils Absolute 0 10 Thousand/µL      Basophils Absolute 0 10 Thousands/µL           I/O Past 24 hours:  No intake/output data recorded  No intake/output data recorded  Assessment / Plan:     [facility]    Recommended Treatment:      Medication changes:  Haldol dec 50 mg IM today    Non-pharmacological treatments  1) Continue with group therapy, milieu therapy and occupational therapy  Safety  1) Safety/communication plan established targeting dynamic risk factors above  2) Risks, benefits, and possible side effects of medications explained to patient and patient verbalizes understanding  Counseling / Coordination of Care    Total floor / unit time spent today 20 minutes  Greater than 50% of total time was spent with the patient and / or family counseling and / or coordination of care  A description of the counseling / coordination of care  Patient's Rights, confidentiality and exceptions to confidentiality, use of automated medical record, [address] [place] [address] staff access to medical record, and consent to treatment reviewed      Carmen Whitaker PA-C

## 2018-08-14 NOTE — PLAN OF CARE
Problem: Alteration in Thoughts and Perception  Goal: Verbalize thoughts and feelings  Interventions:  - Promote a nonjudgmental and trusting relationship with the patient through active listening and therapeutic communication  - Assess patient's level of functioning, behavior and potential for risk  - Engage patient in 1 on 1 interactions for a minimum of 15 minutes each session  - Encourage patient to express fears, feelings, frustrations, and discuss symptoms    - [place] patient to reality, help patient recognize reality-based thinking   - Administer medications as ordered and assess for potential side effects  - Provide the patient education related to the signs and symptoms of the illness and desired effects of prescribed medications   Outcome: Progressing      Problem: Ineffective Coping  Goal: Identifies ineffective coping skills  Outcome: Progressing    Goal: Participates in unit activities  Interventions:  - Provide therapeutic environment   - Provide required programming   - Redirect inappropriate behaviors    Outcome: Progressing      Problem: Risk for Self Injury/Neglect  Goal: Verbalize thoughts and feelings  Interventions:  - Assess and re-assess patient's lethality and potential for self-injury  - Engage patient in 1:1 interactions, daily, for a minimum of 15 minutes  - Encourage patient to express feelings, fears, frustrations, hopes  - Establish rapport/trust with patient    Outcome: Progressing      Problem: Alteration in Orientation  Goal: Attend and participate in unit activities, including therapeutic, recreational, and educational groups  Interventions:  - Provide therapeutic and educational activities daily, encourage attendance and participation, and document same in the medical record   - Provide appropriate opportunities for reminiscence   - Provide a consistent daily routine   - Encourage family contact/visitation    Outcome: Progressing      Comments: Patient has been able to come to staff to report feelings and frustrations  Usually these feelings are causing anxiety which he does request prn meds  He appears to be developing some coping skills; he no longer acts out behaviorally when confronted/challenged  He has been attending some groups  They have reduced in number over the past couple of days due to disruptiveness of the community  Staff will encourage him to continue to make advances in his goal completion

## 2018-08-15 RX ADMIN — BENZTROPINE MESYLATE 0.5 MG: 0.5 TABLET ORAL at 08:14

## 2018-08-15 RX ADMIN — HALOPERIDOL 10 MG: 5 TABLET ORAL at 21:16

## 2018-08-15 RX ADMIN — PANTOPRAZOLE SODIUM 40 MG: 40 TABLET, DELAYED RELEASE ORAL at 06:36

## 2018-08-15 RX ADMIN — BENZTROPINE MESYLATE 0.5 MG: 0.5 TABLET ORAL at 21:17

## 2018-08-15 RX ADMIN — BENZTROPINE MESYLATE 0.5 MG: 0.5 TABLET ORAL at 16:07

## 2018-08-15 RX ADMIN — NICOTINE POLACRILEX 2 MG: 2 GUM, CHEWING ORAL at 08:15

## 2018-08-15 RX ADMIN — OLANZAPINE 20 MG: 10 TABLET, FILM COATED ORAL at 21:16

## 2018-08-15 RX ADMIN — LORAZEPAM 1 MG: 1 TABLET ORAL at 16:09

## 2018-08-15 RX ADMIN — HALOPERIDOL 10 MG: 5 TABLET ORAL at 16:08

## 2018-08-15 RX ADMIN — NICOTINE POLACRILEX 2 MG: 2 GUM, CHEWING ORAL at 08:13

## 2018-08-15 RX ADMIN — NICOTINE POLACRILEX 2 MG: 2 GUM, CHEWING ORAL at 12:12

## 2018-08-15 RX ADMIN — LORAZEPAM 1 MG: 1 TABLET ORAL at 08:14

## 2018-08-15 RX ADMIN — OLANZAPINE 10 MG: 10 TABLET, FILM COATED ORAL at 08:21

## 2018-08-15 RX ADMIN — HALOPERIDOL 10 MG: 5 TABLET ORAL at 08:13

## 2018-08-15 RX ADMIN — NICOTINE POLACRILEX 2 MG: 2 GUM, CHEWING ORAL at 12:13

## 2018-08-15 NOTE — NURSING NOTE
Received patient at 1900  Patient was cooperative and withdrawn to room during the evening  Patient did present for evening snack and medications  Patient in bed ~2100  Patient has appeared to be sleeping  Will continue to monitor closely

## 2018-08-15 NOTE — PLAN OF CARE
Problem: Alteration in Thoughts and Perception  Goal: Verbalize thoughts and feelings  Interventions:  - Promote a nonjudgmental and trusting relationship with the patient through active listening and therapeutic communication  - Assess patient's level of functioning, behavior and potential for risk  - Engage patient in 1 on 1 interactions for a minimum of 15 minutes each session  - Encourage patient to express fears, feelings, frustrations, and discuss symptoms    - [place] patient to reality, help patient recognize reality-based thinking   - Administer medications as ordered and assess for potential side effects  - Provide the patient education related to the signs and symptoms of the illness and desired effects of prescribed medications   Outcome: Progressing      Problem: Ineffective Coping  Goal: Identifies ineffective coping skills  Outcome: Progressing    Goal: Participates in unit activities  Interventions:  - Provide therapeutic environment   - Provide required programming   - Redirect inappropriate behaviors    Outcome: Progressing      Problem: Risk for Self Injury/Neglect  Goal: Verbalize thoughts and feelings  Interventions:  - Assess and re-assess patient's lethality and potential for self-injury  - Engage patient in 1:1 interactions, daily, for a minimum of 15 minutes  - Encourage patient to express feelings, fears, frustrations, hopes  - Establish rapport/trust with patient    Outcome: Progressing      Comments: Patient is cooperative with treatment and has been coming to staff to report his symptoms  Patient taking PRN medications to manage symptoms

## 2018-08-15 NOTE — PLAN OF CARE
Alteration in Orientation     Attend and participate in unit activities, including therapeutic, recreational, and educational groups Progressing        ANXIETY     Will report anxiety at manageable levels Progressing        [address] Discharge to home or other facility with appropriate resources Progressing

## 2018-08-15 NOTE — PROGRESS NOTES
Patient is doing well he is compliant with his treatment plan also attends most group  Gets along well with peers Continues to be med and meal compliant Patient may be discharged soon

## 2018-08-15 NOTE — PROGRESS NOTES
Psychiatry Progress Note    Subjective: Interval History     Patient received Haldol Decanoate yesterday with no complications  Patient with no tenderness in the left deltoid  Patient reports that he continues to feel well  Patient continues to have a more organized thought process throughout assessment  Patient appropriate in his affect  Patient not displaying any psychosis  Patient with no depressive symptoms, suicidal thoughts, or homicidal ideations  Patient with improved insight into his psychiatric admission  Patient continues to expresses desire to have outpatient services in place to continue his treatment      Current medications:    Current Facility-Administered Medications:     acetaminophen (TYLENOL) tablet 650 mg, 650 mg, Oral, Q4H PRN, Basilio Yates MD    benztropine (COGENTIN) tablet 0 5 mg, 0 5 mg, Oral, TID, Katharina Busvidal, PA-C, 0 5 mg at 08/15/18 6141    benztropine (COGENTIN) tablet 1 mg, 1 mg, Oral, Q4H PRN, Katharina Buster, PA-C, 1 mg at 07/28/18 1952    diphenhydrAMINE (BENADRYL) injection 50 mg, 50 mg, Intramuscular, Q4H PRN, Katharina Sebastian PA-C    docusate sodium (COLACE) capsule 100 mg, 100 mg, Oral, Q6H PRN, Katharinanikhil Sebastian, PA-C    haloperidol (HALDOL) tablet 10 mg, 10 mg, Oral, Q4H PRN, Katharina Buster, PA-C, 10 mg at 07/28/18 1953    haloperidol (HALDOL) tablet 10 mg, 10 mg, Oral, TID, Katharina Busvidal, PA-C, 10 mg at 08/15/18 0813    haloperidol decanoate (HALDOL DECANOATE) IM injection 50 mg, 50 mg, Intramuscular, Q28 Days, Katharina Buster, PA-C, 50 mg at 08/14/18 1517    haloperidol lactate (HALDOL) injection 10 mg, 10 mg, Intramuscular, Q4H PRN, Katharina Sebastian PA-C    hydrOXYzine HCL (ATARAX) tablet 100 mg, 100 mg, Oral, Q6H PRN, Katharina Busvidal, PA-C, 100 mg at 08/10/18 0848    ibuprofen (MOTRIN) tablet 400 mg, 400 mg, Oral, Q6H PRN, Katharina Sebastian PA-C    loperamide (IMODIUM) capsule 2 mg, 2 mg, Oral, Q4H PRN, Katharina Sebastian PA-C, 2 mg at 08/04/18 0836    LORazepam (ATIVAN) 2 mg/mL injection 2 mg, 2 mg, Intramuscular, Q6H PRN, Tommy Louins, PA-C    LORazepam (ATIVAN) tablet 1 mg, 1 mg, Oral, Q4H PRN, Tommy Cumins, PA-C, 1 mg at 08/15/18 0814    melatonin tablet 9 mg, 9 mg, Oral, HS PRN, Tommy Cumins, PA-C    nicotine polacrilex (NICORETTE) gum 2 mg, 2 mg, Oral, Q4H PRN, Tommy Cumins, PA-C, 2 mg at 08/15/18 0815    OLANZapine (ZyPREXA) tablet 10 mg, 10 mg, Oral, Daily, Tommy Cumins, PA-C, 10 mg at 08/15/18 7660    OLANZapine (ZyPREXA) tablet 20 mg, 20 mg, Oral, HS, Tommy Cumins, PA-C, 20 mg at 08/14/18 2116    pantoprazole (PROTONIX) EC tablet 40 mg, 40 mg, Oral, Early Morning, Tommy Luoins, PA-C, 40 mg at 08/15/18 0636    polyethylene glycol (MIRALAX) packet 17 g, 17 g, Oral, BID PRN, Tommyralf Soriano, PA-C      Objective:     Vital Signs:  Vitals:    08/13/18 0756 08/13/18 0757 08/14/18 0700 08/14/18 0701   BP: 98/73 98/73 125/80 123/85   BP Location: Left arm Left arm Left arm Left arm   Pulse: 78 78 57 62   Resp:   16    Temp:   98 7 °F (37 1 °C)    TempSrc:   Temporal    SpO2:       Weight:       Height:             Appearance:  age appropriate and casually dressed   Behavior:  normal   Speech:  normal volume   Mood:  normal   Affect:  normal   Thought Process:  normal   Thought Content:  normal   Perceptual Disturbances: None   Risk Potential: none   Sensorium:  person, place and time   Cognition:  intact   Consciousness:  alert and awake    Attention: poor   Intellect: average   Insight:  limited   Judgment: limited      Motor Activity: no abnormal movements           Recent Labs:  Results Reviewed     Procedure Component Value Units Date/Time    Urine Microscopic [[id number]]  (Abnormal) Collected:  07/19/18 7139    Lab Status:  Final result Specimen:  Urine from Urine, Clean Catch Updated:  07/19/18 1857     RBC, UA 2-4 (A) /hpf      WBC, UA 4-10 (A) /hpf      Epithelial Cells Occasional /hpf Bacteria, UA None Seen /hpf     Rapid drug screen, urine [[id number]]  (Normal) Collected:  07/19/18 1759    Lab Status:  Final result Specimen:  Urine from Urine, Clean Catch Updated:  07/19/18 1839     Amph/Meth UR Negative     Barbiturate Ur Negative     Benzodiazepine Urine Negative     Cocaine Urine Negative     Methadone Urine Negative     Opiate Urine Negative     PCP Ur Negative     THC Urine Negative    Narrative:         FOR MEDICAL PURPOSES ONLY  IF CONFIRMATION NEEDED PLEASE CONTACT THE LAB WITHIN 5 DAYS  Drug Screen Cutoff Levels:  AMPHETAMINE/METHAMPHETAMINES  1000 ng/mL  BARBITURATES     200 ng/mL  BENZODIAZEPINES     200 ng/mL  COCAINE      300 ng/mL  METHADONE      300 ng/mL  OPIATES      300 ng/mL  PHENCYCLIDINE     25 ng/mL  THC       50 ng/mL    Ethanol [[id number]]  (Normal) Collected:  07/19/18 1757    Lab Status:  Final result Specimen:  Blood from Arm, Left Updated:  07/19/18 1826     Ethanol Lvl <10 mg/dL     Comprehensive metabolic panel [[id number]]  (Abnormal) Collected:  07/19/18 1757    Lab Status:  Final result Specimen:  Blood from Arm, Left Updated:  07/19/18 1826     Sodium 144 mmol/L      Potassium 3 5 (L) mmol/L      Chloride 106 mmol/L      CO2 28 mmol/L      Anion Gap 10 mmol/L      BUN 19 mg/dL      Creatinine 1 02 mg/dL      Glucose 117 (H) mg/dL      Calcium 9 6 mg/dL      AST 41 U/L      ALT 49 U/L      Alkaline Phosphatase 72 U/L      Total Protein 7 8 g/dL      Albumin 4 6 g/dL      Total Bilirubin 0 60 mg/dL      eGFR 86 ml/min/1 73sq m     Narrative:         National Kidney Disease Education Program recommendations are as follows:  GFR calculation is accurate only with a steady state creatinine  Chronic Kidney disease less than 60 ml/min/1 73 sq  meters  Kidney failure less than 15 ml/min/1 73 sq  meters      UA w Reflex to Microscopic [[id number]]  (Abnormal) Collected:  07/19/18 1759    Lab Status:  Final result Specimen:  Urine from Urine, Clean Catch Updated: 07/19/18 1823     Color, UA Yellow     Clarity, UA Clear     Specific Gravity, UA 1 025     pH, UA 5 0     Leukocytes, UA 25 0 (A)     Nitrite, UA Negative     Protein, UA 30 (1+) (A) mg/dl      Glucose, UA Negative mg/dl      Ketones, UA Negative mg/dl      Bilirubin, UA Negative     Blood, UA 10 0 (A)     UROBILINOGEN UA Negative mg/dL     CBC and differential [[id number]]  (Abnormal) Collected:  07/19/18 1757    Lab Status:  Final result Specimen:  Blood from Arm, Left Updated:  07/19/18 1811     WBC 7 20 Thousand/uL      RBC 4 35 (L) Million/uL      Hemoglobin 13 4 (L) g/dL      Hematocrit 39 8 (L) %      MCV 92 fL      MCH 30 9 pg      MCHC 33 8 g/dL      RDW 14 1 %      MPV 9 0 fL      Platelets 934 Thousands/uL      Neutrophils Relative 58 %      Lymphocytes Relative 32 %      Monocytes Relative 8 %      Eosinophils Relative 2 %      Basophils Relative 1 %      Neutrophils Absolute 4 20 Thousands/µL      Lymphocytes Absolute 2 30 Thousands/µL      Monocytes Absolute 0 60 Thousand/µL      Eosinophils Absolute 0 10 Thousand/µL      Basophils Absolute 0 10 Thousands/µL           I/O Past 24 hours:  No intake/output data recorded  No intake/output data recorded  Assessment / Plan:     Schizoaffective disorder, bipolar type ([facility] )    Recommended Treatment:      Medication changes:  Continue to monitor on current medication regimen  Non-pharmacological treatments  1) Continue with group therapy, milieu therapy and occupational therapy  Safety  1) Safety/communication plan established targeting dynamic risk factors above  2) Risks, benefits, and possible side effects of medications explained to patient and patient verbalizes understanding  Counseling / Coordination of Care    Total floor / unit time spent today 20 minutes  Greater than 50% of total time was spent with the patient and / or family counseling and / or coordination of care  A description of the counseling / coordination of care  Patient's Rights, confidentiality and exceptions to confidentiality, use of automated medical record, [facility] staff access to medical record, and consent to treatment reviewed      Jaya Lyons PA-C

## 2018-08-16 VITALS
HEART RATE: 58 BPM | TEMPERATURE: 97 F | DIASTOLIC BLOOD PRESSURE: 88 MMHG | RESPIRATION RATE: 16 BRPM | BODY MASS INDEX: 21.54 KG/M2 | HEIGHT: 72 IN | SYSTOLIC BLOOD PRESSURE: 133 MMHG | OXYGEN SATURATION: 99 % | WEIGHT: 159 LBS

## 2018-08-16 RX ORDER — PANTOPRAZOLE SODIUM 40 MG/1
40 TABLET, DELAYED RELEASE ORAL
Refills: 0
Start: 2018-08-17

## 2018-08-16 RX ORDER — HALOPERIDOL DECANOATE 50 MG/ML
50 INJECTION INTRAMUSCULAR
Qty: 1 ML | Refills: 0
Start: 2018-09-11

## 2018-08-16 RX ORDER — OLANZAPINE 20 MG/1
20 TABLET ORAL
Refills: 0
Start: 2018-08-16

## 2018-08-16 RX ORDER — LORAZEPAM 1 MG/1
1 TABLET ORAL 2 TIMES DAILY PRN
Qty: 30 TABLET | Refills: 0
Start: 2018-08-16 | End: 2018-08-26

## 2018-08-16 RX ORDER — BENZTROPINE MESYLATE 0.5 MG/1
0.5 TABLET ORAL 2 TIMES DAILY
Refills: 0
Start: 2018-08-16

## 2018-08-16 RX ADMIN — LORAZEPAM 1 MG: 1 TABLET ORAL at 08:27

## 2018-08-16 RX ADMIN — PANTOPRAZOLE SODIUM 40 MG: 40 TABLET, DELAYED RELEASE ORAL at 06:15

## 2018-08-16 RX ADMIN — HALOPERIDOL 10 MG: 5 TABLET ORAL at 08:01

## 2018-08-16 RX ADMIN — BENZTROPINE MESYLATE 0.5 MG: 0.5 TABLET ORAL at 08:02

## 2018-08-16 RX ADMIN — NICOTINE POLACRILEX 2 MG: 2 GUM, CHEWING ORAL at 08:28

## 2018-08-16 NOTE — NURSING NOTE
Received patient at 1900  Patient was cooperative during the evening  Patient sat in dining room socializing with peers for a short time but went to bed early  Patient denied any anxiety, depression, SI/HI  Patient compliant with medications  Patient has appeared to be sleeping for past few hours  Will continue to monitor closely

## 2018-08-16 NOTE — NURSING NOTE
Patient has appeared to sleep through the night without interruption  Will continue to monitor closely

## 2018-08-16 NOTE — DISCHARGE INSTRUCTIONS
Schizoaffective Disorder   WHAT YOU NEED TO KNOW:   Schizoaffective disorder is a long-term mental illness that may change how you think, feel, and act around others  You may not know what is real and what is not real    DISCHARGE INSTRUCTIONS:   Medicines:   · Antipsychotics: These medicines help decrease psychotic symptoms or severe agitation  You may need antiparkinson medicine to control muscle stiffness, twitches, and restlessness caused by antipsychotic medicines  · Antianxiety medicine: This medicine may be given to decrease anxiety and help you feel calm and relaxed  · Antidepressants: These medicines are given to decrease or stop the symptoms of depression, anxiety, and behavior problems  · Mood stabilizers: These medicines help control mood swings  · Anticonvulsants: This medicine is given to control seizures  It may also be used to decrease violent behavior and control your mood swings  · Blood pressure medicines: These may be used to help decrease motor tics (uncontrolled movements)  They may also help you feel calmer, more focused, and less irritable  · Anticholinergics: This medicine may be given to decrease the side effects of other needed medicines  · Take your medicine as directed  Contact your healthcare provider if you think your medicine is not helping or if you have side effects  Tell him of her if you are allergic to any medicine  Keep a list of the medicines, vitamins, and herbs you take  Include the amounts, and when and why you take them  Bring the list or the pill bottles to follow-up visits  Carry your medicine list with you in case of an emergency  Manage your symptoms: The following may help you feel better or prevent symptoms of schizoaffective disorder from coming back:  · Find support for yourself and your family:  Talk with others to help you cope with your illness better  This may also help to improve how you relate to others      · Keep all medical appointments: This will help manage your disease and the side-effects from medicines you may be taking  · Use your medicines as directed:  Put your medicines in a pillbox placed in an area you can easily see  Use a watch with an alarm to help you remember when it is time to take your medicine  Tell your healthcare provider if you know or think you might be pregnant  Do not stop taking your medicines without your healthcare provider's okay  A sudden stop can cause serious medical problems  · Watch for early signs of a relapse and seek help immediately:      ¨ How you think, feel, and see things has changed  ¨ You behave differently than usual     ¨ You become more nervous and upset, but do not know why  ¨ You eat less and have trouble sleeping  ¨ You have little or no interest in friends or activities  Contact your healthcare provider or psychiatrist if:   · You think you are having a relapse  · You are having side effects from your medicine, or they are not helping  · You are not sleeping well or are sleeping more than usual     · You cannot eat or are eating more than usual     · You have muscle spasms, stiffness, or trouble walking  · Your sad feelings or thoughts change the way you function during the day  · You have questions or concerns about your condition or care  Return to the emergency department if:   · You feel like hurting or killing yourself or others  · You feel that your condition is getting worse  · You feel very upset, threaten someone, or you feel violent  · You suddenly have changes in your vision  · You suddenly have chest pain, trouble breathing, or a fever  © 2017 [address] Information is for End User's use only and may not be sold, redistributed or otherwise used for commercial purposes  All illustrations and images included in CareNotes® are the copyrighted property of A D A M [employer]  or [employer]    The above information is an  only  It is not intended as medical advice for individual conditions or treatments  Talk to your doctor, nurse or pharmacist before following any medical regimen to see if it is safe and effective for you

## 2018-08-16 NOTE — PROGRESS NOTES
Patient pleasant upon approach  Patient denies depression, anxiety, S/HI,A/VH and pain at this time  Patient later returns to ask this author for ativan and nicotine gum  Patient appearing somewhat anxious regarding discharge pending today  Given as requested  Patient visible and social on the  Unit  Med and meal compliant  Will continue to monitor and provide therapeutic support

## 2018-08-16 NOTE — NURSING NOTE
DISCHARGE NOTE : Patient and Viraj [place] reviewed discharge instructions and patient has no further questions at this time  Patient denies all psych symptoms at time of discharge  Patient medications were called into [facility] [address] pharmacy and patient aware to  upon discharged  Patient being discharged to home and will be walking  All belongings in patient possession
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
## 2018-08-16 NOTE — PLAN OF CARE
Problem: Alteration in Thoughts and Perception  Goal: Verbalize thoughts and feelings  Interventions:  - Promote a nonjudgmental and trusting relationship with the patient through active listening and therapeutic communication  - Assess patient's level of functioning, behavior and potential for risk  - Engage patient in 1 on 1 interactions for a minimum of 15 minutes each session  - Encourage patient to express fears, feelings, frustrations, and discuss symptoms    - [place] patient to reality, help patient recognize reality-based thinking   - Administer medications as ordered and assess for potential side effects  - Provide the patient education related to the signs and symptoms of the illness and desired effects of prescribed medications   Outcome: Adequate for Discharge      Problem: Ineffective Coping  Goal: Identifies ineffective coping skills  Outcome: Adequate for Discharge    Goal: Participates in unit activities  Interventions:  - Provide therapeutic environment   - Provide required programming   - Redirect inappropriate behaviors    Outcome: Adequate for Discharge      Problem: Risk for Self Injury/Neglect  Goal: Verbalize thoughts and feelings  Interventions:  - Assess and re-assess patient's lethality and potential for self-injury  - Engage patient in 1:1 interactions, daily, for a minimum of 15 minutes  - Encourage patient to express feelings, fears, frustrations, hopes  - Establish rapport/trust with patient    Outcome: Completed Date Met: 08/16/18      Problem: Alteration in Orientation  Goal: Attend and participate in unit activities, including therapeutic, recreational, and educational groups  Interventions:  - Provide therapeutic and educational activities daily, encourage attendance and participation, and document same in the medical record   - Provide appropriate opportunities for reminiscence   - Provide a consistent daily routine   - Encourage family contact/visitation    Outcome: Adequate for Discharge      Comments: Patient continues to be  Cooperative and compliant with treatment  Patient has also been more social  Patient has not recently reported any delusional content

## 2019-01-07 NOTE — TELEPHONE ENCOUNTER
Received refill request from pharmacy for amlodipine and bupropion  Amlodipine was discontinued in August 2018 when pt was discharged from [facility] and I do not see bupropion in pt's current or historical medication list but did see it was stopped on 6/11/18 in [place] [address]  Pt has not been seen since 6/11/18  Pt was scheduled on 7/9/18 but left without being seen  I attempted to contact pt to rescheduled appt but was not able to reach him  Mobile # is [employer], someone at work # stated pt did not work there and person at emergency contact phone # stated I had the wrong # and they did not know the pt  I contacted the pharmacy to see if they had a different # than the 3 listed in pt's chart but they did not  I advised pharmacist that both of these medications were discontinued

## 2019-01-24 ENCOUNTER — TELEPHONE (OUTPATIENT)
Dept: FAMILY MEDICINE CLINIC | Facility: CLINIC | Age: 49
End: 2019-01-24

## 2019-01-24 NOTE — TELEPHONE ENCOUNTER
Patient is not on this medication and left without being seen at his previous appointment  He would need to have an appointment before I add any additional psych meds back to his regimen  Also, he has very unstable bipolar disorder, he is the one that plans to run for president and brought in a letter at his previous visit to [address] [place] [address]

## 2019-03-01 ENCOUNTER — TELEPHONE (OUTPATIENT)
Dept: FAMILY MEDICINE CLINIC | Facility: CLINIC | Age: 49
End: 2019-03-01

## 2023-08-10 NOTE — PROGRESS NOTES
Client remains withdrawn to room  Continues to deny HI, SI, depression, anxiety, AH, VH  Denies loose stool, abdominal cramping and discomfort at this time  123

## 2024-01-04 NOTE — ED NOTES
Pt in bed sleeping, no signs of distress or discomfort  Continues to have 1:1 at bedside         Dario Winters RN  07/20/18 0172   Mr. Luana Luna is a  72-year-old female with a past medical history of sleep apnea, diabetes, GERD, anemia, who initially presented to [facility] for several GI issues.clinic visit 11/09/2023: brFirst of all the patient has new onset hematochezia for the last 4 months.  She reports it is intermittent sometimes going days to weeks in between episodes but sometimes feels the toilet or is seen on the toilet tissue when she wipes.  She reports about 1 month ago she also had black stool for about 3 days which resolved spontaneously.  She does not use any acid reflux medication except for Tums 2 times per day.  No nausea vomiting.  No unintentional weight loss but she has gained 30 lb unintentionally over the last 3 months.  She has some chronic constipation and has 1 bowel movement a day or every other day.  She takes magnesium for that recently but stopped it.  She also has a personal history of fatty liver disease but is unsure if she has ever had elevated liver enzymes.  She drinks 2 cups of black coffee per day.  Her brother had liver cancer and mother had breast cancer.  She previously was being seen by gastroenterologist Dr. Berg and Dr. Fernandez but they no longer practice so she is switching.  she had an EGD done last year which she reports had a hiatal hernia.  She also had a colonoscopy last year in August.  The patient reports that she had a highly suspicious polyp that was removed and she was told to repeat her colonoscopy in 6 months.  However Dr. Mcnulty  note from her referral states that repeat colonoscopy was recommended in 3-5 years.  She also has a history of pancreatitis in 2018. She had episode of severe epigastric pain and had a CT scan done which confirmed this according to her.  She was never told what the etiology was but she was given pancreatic enzymes and her symptoms resolved.  She has not had any issues with that since then and she is no longer taking enzymes.  Previous abdominal surgeries include hysterectomy.  She does not smoke, drink alcohol, or use recreational drugs.  She is here with her  today.EGD/colonoscopy 12/12/23:brPatient had some nausea with the prep, not on NSAIDs, not on PPI, has still had bleeding with the prep, uses Tums a lot, last BM was clear and yellow.
br
br      Clinic visit 01/04/2024: 
br   Patient reports that since she started the antibiotics for H pylori infection she has had worsening epigastric and left upper quadrant pain.  She describes it as mostly as a discomfort.  She had some black stools that started after she took Pepto-Bismol so she stopped taking the Pepto-Bismol about  3 days ago.  She has occasional red blood in the stool which improved after using Anusol suppository.  Her colonoscopy showed no significant colon polyps but there was internal hemorrhoids which were likely the source of her bleeding.  The EGD showed no ulcers or masses, biopsies were positive for H pylori.  She endorses no nausea or vomiting.  She is very unsteady on her feet and has issues with weight gain over the last few months.  Her  states that her balance has been off since she had her hip replacement surgery a few years ago and she has a walker that she does not use very often.  I encouraged her to use an assisted device to avoid having a fall and discussed with her primary care provider if any further evaluation needs to happen.

## 2024-05-07 NOTE — NURSING NOTE
Received patient at 1900  Patient was isolative to room during the evening but did come out for snack  Patient appeared to sleep early  Patient was pleasant on approach but refused medications stating "I don't need them anymore " Patient has appeared to be sleeping for the past few hours  Will continue to monitor closely on assault and suicide precautions  Cephalic